# Patient Record
Sex: FEMALE | Race: WHITE | Employment: UNEMPLOYED | URBAN - METROPOLITAN AREA
[De-identification: names, ages, dates, MRNs, and addresses within clinical notes are randomized per-mention and may not be internally consistent; named-entity substitution may affect disease eponyms.]

---

## 2017-12-18 ENCOUNTER — APPOINTMENT (OUTPATIENT)
Dept: ULTRASOUND IMAGING | Age: 37
End: 2017-12-18
Attending: EMERGENCY MEDICINE
Payer: MEDICAID

## 2017-12-18 ENCOUNTER — HOSPITAL ENCOUNTER (EMERGENCY)
Age: 37
Discharge: HOME | End: 2017-12-18
Attending: EMERGENCY MEDICINE
Payer: MEDICAID

## 2017-12-18 VITALS
TEMPERATURE: 98.7 F | WEIGHT: 245 LBS | RESPIRATION RATE: 20 BRPM | HEART RATE: 100 BPM | OXYGEN SATURATION: 99 % | BODY MASS INDEX: 33.18 KG/M2 | HEIGHT: 72 IN | SYSTOLIC BLOOD PRESSURE: 129 MMHG | DIASTOLIC BLOOD PRESSURE: 78 MMHG

## 2017-12-18 DIAGNOSIS — R10.9 ABDOMINAL PAIN AFFECTING PREGNANCY: Primary | ICD-10-CM

## 2017-12-18 DIAGNOSIS — A59.01 TRICHOMONAL VAGINITIS DURING PREGNANCY IN SECOND TRIMESTER: ICD-10-CM

## 2017-12-18 DIAGNOSIS — O26.899 ABDOMINAL PAIN AFFECTING PREGNANCY: Primary | ICD-10-CM

## 2017-12-18 DIAGNOSIS — O23.592 TRICHOMONAL VAGINITIS DURING PREGNANCY IN SECOND TRIMESTER: ICD-10-CM

## 2017-12-18 LAB
ABO + RH BLD: NORMAL
ALBUMIN SERPL-MCNC: 3 G/DL (ref 3.5–5)
ALBUMIN/GLOB SERPL: 0.8 {RATIO} (ref 1.1–2.2)
ALP SERPL-CCNC: 82 U/L (ref 45–117)
ALT SERPL-CCNC: 19 U/L (ref 12–78)
ANION GAP SERPL CALC-SCNC: 11 MMOL/L (ref 5–15)
APPEARANCE UR: ABNORMAL
AST SERPL-CCNC: 21 U/L (ref 15–37)
BACTERIA URNS QL MICRO: NEGATIVE /HPF
BASOPHILS # BLD: 0 K/UL (ref 0–0.1)
BASOPHILS NFR BLD: 0 % (ref 0–1)
BILIRUB SERPL-MCNC: 0.2 MG/DL (ref 0.2–1)
BILIRUB UR QL: NEGATIVE
BLOOD BANK CMNT PATIENT-IMP: NORMAL
BUN SERPL-MCNC: 12 MG/DL (ref 6–20)
BUN/CREAT SERPL: 19 (ref 12–20)
C TRACH DNA SPEC QL NAA+PROBE: NEGATIVE
CALCIUM SERPL-MCNC: 9 MG/DL (ref 8.5–10.1)
CHLORIDE SERPL-SCNC: 103 MMOL/L (ref 97–108)
CLUE CELLS VAG QL WET PREP: NORMAL
CO2 SERPL-SCNC: 21 MMOL/L (ref 21–32)
COLOR UR: ABNORMAL
CREAT SERPL-MCNC: 0.62 MG/DL (ref 0.55–1.02)
DIFFERENTIAL METHOD BLD: ABNORMAL
EOSINOPHIL # BLD: 0.6 K/UL (ref 0–0.4)
EOSINOPHIL NFR BLD: 4 % (ref 0–7)
EPITH CASTS URNS QL MICRO: ABNORMAL /LPF
ERYTHROCYTE [DISTWIDTH] IN BLOOD BY AUTOMATED COUNT: 15.4 % (ref 11.5–14.5)
GLOBULIN SER CALC-MCNC: 3.9 G/DL (ref 2–4)
GLUCOSE SERPL-MCNC: 112 MG/DL (ref 65–100)
GLUCOSE UR STRIP.AUTO-MCNC: NEGATIVE MG/DL
HCG SERPL-ACNC: 6402 MIU/ML (ref 0–6)
HCG UR QL: POSITIVE
HCT VFR BLD AUTO: 36.8 % (ref 35–47)
HGB BLD-MCNC: 12 G/DL (ref 11.5–16)
HGB UR QL STRIP: NEGATIVE
KETONES UR QL STRIP.AUTO: NEGATIVE MG/DL
KOH PREP SPEC: NORMAL
LEUKOCYTE ESTERASE UR QL STRIP.AUTO: ABNORMAL
LYMPHOCYTES # BLD: 2 K/UL (ref 0.8–3.5)
LYMPHOCYTES NFR BLD: 15 % (ref 12–49)
MCH RBC QN AUTO: 27.6 PG (ref 26–34)
MCHC RBC AUTO-ENTMCNC: 32.6 G/DL (ref 30–36.5)
MCV RBC AUTO: 84.8 FL (ref 80–99)
MONOCYTES # BLD: 0.8 K/UL (ref 0–1)
MONOCYTES NFR BLD: 6 % (ref 5–13)
N GONORRHOEA DNA SPEC QL NAA+PROBE: NEGATIVE
NEUTS SEG # BLD: 9.9 K/UL (ref 1.8–8)
NEUTS SEG NFR BLD: 75 % (ref 32–75)
NITRITE UR QL STRIP.AUTO: NEGATIVE
PH UR STRIP: 5.5 [PH] (ref 5–8)
PLATELET # BLD AUTO: 129 K/UL (ref 150–400)
POTASSIUM SERPL-SCNC: 3.4 MMOL/L (ref 3.5–5.1)
PROT SERPL-MCNC: 6.9 G/DL (ref 6.4–8.2)
PROT UR STRIP-MCNC: NEGATIVE MG/DL
RBC # BLD AUTO: 4.34 M/UL (ref 3.8–5.2)
RBC #/AREA URNS HPF: ABNORMAL /HPF (ref 0–5)
SAMPLE TYPE: NORMAL
SERVICE CMNT-IMP: NORMAL
SERVICE CMNT-IMP: NORMAL
SODIUM SERPL-SCNC: 135 MMOL/L (ref 136–145)
SP GR UR REFRACTOMETRY: 1.02 (ref 1–1.03)
SPECIMEN SOURCE: NORMAL
T VAGINALIS VAG QL WET PREP: NORMAL
TRICHOMONAS UR QL MICRO: PRESENT
UR CULT HOLD, URHOLD: NORMAL
UROBILINOGEN UR QL STRIP.AUTO: 0.2 EU/DL (ref 0.2–1)
WBC # BLD AUTO: 13.2 K/UL (ref 3.6–11)
WBC URNS QL MICRO: ABNORMAL /HPF (ref 0–4)

## 2017-12-18 PROCEDURE — 76815 OB US LIMITED FETUS(S): CPT

## 2017-12-18 PROCEDURE — 85025 COMPLETE CBC W/AUTO DIFF WBC: CPT | Performed by: EMERGENCY MEDICINE

## 2017-12-18 PROCEDURE — 84702 CHORIONIC GONADOTROPIN TEST: CPT | Performed by: EMERGENCY MEDICINE

## 2017-12-18 PROCEDURE — 87491 CHLMYD TRACH DNA AMP PROBE: CPT | Performed by: EMERGENCY MEDICINE

## 2017-12-18 PROCEDURE — 86900 BLOOD TYPING SEROLOGIC ABO: CPT | Performed by: EMERGENCY MEDICINE

## 2017-12-18 PROCEDURE — 36415 COLL VENOUS BLD VENIPUNCTURE: CPT | Performed by: EMERGENCY MEDICINE

## 2017-12-18 PROCEDURE — 81001 URINALYSIS AUTO W/SCOPE: CPT | Performed by: EMERGENCY MEDICINE

## 2017-12-18 PROCEDURE — 96365 THER/PROPH/DIAG IV INF INIT: CPT

## 2017-12-18 PROCEDURE — 81025 URINE PREGNANCY TEST: CPT

## 2017-12-18 PROCEDURE — 74011250636 HC RX REV CODE- 250/636: Performed by: EMERGENCY MEDICINE

## 2017-12-18 PROCEDURE — 80053 COMPREHEN METABOLIC PANEL: CPT | Performed by: EMERGENCY MEDICINE

## 2017-12-18 PROCEDURE — 99285 EMERGENCY DEPT VISIT HI MDM: CPT

## 2017-12-18 PROCEDURE — 74011250637 HC RX REV CODE- 250/637: Performed by: EMERGENCY MEDICINE

## 2017-12-18 PROCEDURE — 87210 SMEAR WET MOUNT SALINE/INK: CPT | Performed by: EMERGENCY MEDICINE

## 2017-12-18 PROCEDURE — 74011000258 HC RX REV CODE- 258: Performed by: EMERGENCY MEDICINE

## 2017-12-18 RX ORDER — METRONIDAZOLE 250 MG/1
500 TABLET ORAL
Status: COMPLETED | OUTPATIENT
Start: 2017-12-18 | End: 2017-12-18

## 2017-12-18 RX ORDER — DIPHENHYDRAMINE HCL 25 MG
50 CAPSULE ORAL
COMMUNITY
End: 2017-12-27

## 2017-12-18 RX ORDER — ACETAMINOPHEN 500 MG
1000 TABLET ORAL
Status: COMPLETED | OUTPATIENT
Start: 2017-12-18 | End: 2017-12-18

## 2017-12-18 RX ORDER — AZITHROMYCIN 250 MG/1
1000 TABLET, FILM COATED ORAL
Status: COMPLETED | OUTPATIENT
Start: 2017-12-18 | End: 2017-12-18

## 2017-12-18 RX ORDER — METRONIDAZOLE 500 MG/1
500 TABLET ORAL 2 TIMES DAILY
Qty: 14 TAB | Refills: 0 | Status: SHIPPED | OUTPATIENT
Start: 2017-12-18 | End: 2017-12-25

## 2017-12-18 RX ADMIN — AZITHROMYCIN 1000 MG: 250 TABLET, FILM COATED ORAL at 03:41

## 2017-12-18 RX ADMIN — METRONIDAZOLE 500 MG: 250 TABLET ORAL at 03:55

## 2017-12-18 RX ADMIN — CEFTRIAXONE SODIUM 250 MG: 250 INJECTION, POWDER, FOR SOLUTION INTRAMUSCULAR; INTRAVENOUS at 03:30

## 2017-12-18 RX ADMIN — ACETAMINOPHEN 1000 MG: 500 TABLET ORAL at 02:37

## 2017-12-18 NOTE — ED NOTES
Discharge note: The patient was discharged home in stable condition, accompanied by boyfriend. The patient is alert and oriented, is in no respiratory distress and has vital signs within normal limits. The patient's diagnosis, condition and treatment were explained to patient by Dr Phillip Pacheco and reinforced by nurse. The patient party expressed understanding of discharge instructions, prescriptions, and plan of care. A discharge plan has been developed. A  was not involved in the process. Patient offered a wheelchair to ED lobby for discharge but declined at this time. Patient ambulatory to ED lobby to wait for cab to go home.

## 2017-12-18 NOTE — ED TRIAGE NOTES
Triage note:  Pt arrived via [phone] with c/o abd cramping and spotting. Pt stated she was told she is pregnant.

## 2017-12-18 NOTE — ED PROVIDER NOTES
HPI Comments: Nikki Puente is a 41 yo F with abdominal cramping and spotting. She has a history of bipolar disorder, schizophrenia, COPD and hypertension. She states that last month her mental health physician gave her a pregnancy test that was positive but then she said she took another one that was negative so she continued to take her oral contraceptive pills as wells as her psychiatric medication but she does not remember the name or dose. She spotted some at the end of November but did not have heavy bleeding. She started spotting again yesterday and this evening she started to have lower abdominal cramping. She is not sure when her last normal period was. She has had occasional nausea and vomiting. He has had 4 prior pregnancies that she is aware of but all ended in miscarriage. She denies lightheadedness, heavy bleeding, loss of fluid or passage of clots. She states that she does not have an OBGYN or primary care physician       Past Medical History:   Diagnosis Date    Chronic obstructive pulmonary disease ([facility] [address]     Hypertension     Psychiatric disorder        History reviewed. No pertinent surgical history. History reviewed. No pertinent family history. Social History     Social History    Marital status: N/A     Spouse name: N/A    Number of children: N/A    Years of education: N/A     Occupational History    Not on file. Social History Main Topics    Smoking status: Current Every Day Smoker     Packs/day: 1.00    Smokeless tobacco: Never Used    Alcohol use Not on file    Drug use: Not on file    Sexual activity: Not on file     Other Topics Concern    Not on file     Social History Narrative    No narrative on file         ALLERGIES: Bactrim [sulfamethoprim]; Pcn [penicillins]; and Bee pollen    Review of Systems   Constitutional: Negative for fever. HENT: Negative for sore throat. Eyes: Negative for visual disturbance. Respiratory: Negative for cough. Cardiovascular: Negative for chest pain. Gastrointestinal: Positive for abdominal pain (lower abdomen). Genitourinary: Positive for vaginal bleeding. Negative for dysuria. Musculoskeletal: Negative for back pain. Skin: Negative for rash. Neurological: Negative for headaches. Vitals:    12/18/17 0215 12/18/17 0240 12/18/17 0330 12/18/17 0345   BP: 133/79  127/67 129/78   Pulse:       Resp:       Temp:       SpO2: 98% 98% 99% 99%   Weight:       Height:                Physical Exam   Constitutional: She appears well-developed and well-nourished. No distress. HENT:   Head: Normocephalic and atraumatic. Mouth/Throat: Oropharynx is clear and moist.   Eyes: Conjunctivae and EOM are normal.   Neck: Normal range of motion and phonation normal.   Cardiovascular: Normal rate and intact distal pulses. Pulmonary/Chest: Effort normal. No respiratory distress. Abdominal: She exhibits mass (gravid, fundal height 5 cm below umbilicus). She exhibits no distension. Genitourinary: Cervix exhibits no motion tenderness and no friability. Vaginal discharge found. Musculoskeletal: Normal range of motion. She exhibits no tenderness. Neurological: She is alert. She is not disoriented. She exhibits normal muscle tone. Skin: Skin is warm and dry. Nursing note and vitals reviewed. [facility]  ED Course     3:39 AM  PAtient reassessed and states that pain is resolved. Wet prep significant for BV and trichamoniasis. Will treat with metronidazole 500mg BID for 7 days. Will also cover for GC and chlamydia with rocephin and azithromycin. US shows single IUP with gestational age 16weeks 2 days and . Advised patient to establish care with OB as soon as possible and follow-up with her psychiatrist to discuss discontinuation of meds.     Procedures

## 2017-12-18 NOTE — DISCHARGE INSTRUCTIONS
Trichomoniasis: Care Instructions  Your Care Instructions  Trichomoniasis is a sexually transmitted infection (STI) that is spread by having sex with an infected partner. Trichomoniasis is commonly called trich (say \"trick\"). In women, trich may cause vaginal itching and a smelly discharge. But in many cases, especially in men, there are no symptoms. Althnadir Forman is treated so that you do not spread it to others. Both you and your sex partner or partners should be treated at the same time so you do not infect each other again. Trich may cause problems with pregnancy. Your doctor will talk with you about treatment for Trich if you are pregnant. Follow-up care is a key part of your treatment and safety. Be sure to make and go to all appointments, and call your doctor if you are having problems. It's also a good idea to know your test results and keep a list of the medicines you take. How can you care for yourself at home? · Take your antibiotics as directed. Do not stop taking them just because you feel better. You need to take the full course of antibiotics. · Do not have sex while you are being treated. If your doctor gave you a single dose of antibiotics, do not have sex for one week after being treated and until your partner also has been treated. · Tell your sex partner (or partners) that he or she will also need to be tested and treated. · Use a cold water compress or cool baths to relieve itching. To prevent trichomoniasis in the future  · Use latex condoms every time you have sex. Use them from the beginning to the end of sexual contact. · Talk to your partner before having sex. Find out if he or she has or is at risk for trich or any other STI. Keep in mind that a person may be able to spread an STI even if he or she does not have symptoms. · Do not have sex if you are being treated for trich or any other STI.   · Do not have sex with anyone who has symptoms of an STI, such as sores on the genitals or mouth. · Having one sex partner (who does not have STIs and does not have sex with anyone else) is a good way to avoid STIs. When should you call for help? Call your doctor now or seek immediate medical care if:  ? · You have unusual vaginal bleeding. ? · You have a fever. ? · You have new discharge from the vagina or penis. ? · You have pelvic pain. ? Watch closely for changes in your health, and be sure to contact your doctor if:  ? · You do not get better as expected. ? · You have any new symptoms or your symptoms get worse. Where can you learn more? Go to http://max-lidia.info/. Enter X195 in the search box to learn more about \"Trichomoniasis: Care Instructions. \"  Current as of: March 20, 2017  Content Version: 11.4  © 5359-7408 [employer]. Care instructions adapted under license by [employer] (which disclaims liability or warranty for this information). If you have questions about a medical condition or this instruction, always ask your healthcare professional. [place] [address] any warranty or liability for your use of this information. Belly Pain in Pregnancy: Care Instructions  Your Care Instructions  When you're pregnant, any belly pain can be a worry. You may not want to call your doctor about every pain you have. But you don't want to miss something that is dangerous for you or your baby. Even if it feels familiar, belly pain can mean something new when you're pregnant. It's important to know when to call your doctor. It will also help to know how to care for yourself at home when your pain is not caused by anything harmful. · When belly pain is more severe or constant, see a doctor right away. · If you're sure your belly pain is a sign of labor, call your doctor. · When belly pain is brief, it's usually a normal part of pregnancy. It might be related to changes in the growing uterus.  Or it could be the stretching of ligaments called round ligaments. These ligaments help support the uterus. Round ligament pain can be on either side of your belly. It can also be felt in your hips or groin. Follow-up care is a key part of your treatment and safety. Be sure to make and go to all appointments, and call your doctor if you are having problems. It's also a good idea to know your test results and keep a list of the medicines you take. How can you tell if belly pain is a sign of labor? When belly pain is caused by labor, it can feel like mild or menstrual-like cramps in your lower belly. These cramps are probably contractions. They can happen in your second or third trimester. You may also have:  · A steady, dull ache in your lower back, pelvis, or thighs. · A feeling of pressure in your pelvis or lower belly. · Changes in your vaginal discharge or a sudden release of fluid from the vagina. If you think you are in labor, call your doctor. How can you care for yourself at home? When belly pain is mild and is not a symptom of labor:  · Rest until you feel better. · Take a warm bath. · Think about what you drink and eat:  ¨ Drink plenty of fluids. Choose water and other caffeine-free clear liquids until you feel better. ¨ Try eating small, frequent meals. If your stomach is upset, try bland, low-fat foods like plain rice, broiled chicken, toast, and yogurt. · Think about how you move if you are having brief pains from stretching of the round ligaments. ¨ Try gentle stretching. ¨ Move a little more slowly when turning in bed or getting up from a chair, so those ligaments don't stretch quickly. ¨ Lean forward a bit if you think you are going to cough or sneeze. When should you call for help? Call 911 anytime you think you may need emergency care. For example, call if:  · You have sudden, severe pain in your belly. · You have severe vaginal bleeding.   Call your doctor now or seek immediate medical care if:  · You have new or worse belly pain or cramping. · You have any vaginal bleeding. · You have a fever. · You have symptoms of preeclampsia, such as:  ¨ Sudden swelling of your face, hands, or feet. ¨ New vision problems (such as dimness or blurring). ¨ A severe headache. · You think that you may be in labor. This means that you've had at least 8 contractions within 1 hour or at least 4 contractions within 20 minutes, even after you change your position and drink fluids. · You have symptoms of a urinary tract infection. These may include:  ¨ Pain or burning when you urinate. ¨ A frequent need to urinate without being able to pass much urine. ¨ Pain in the flank, which is just below the rib cage and above the waist on either side of the back. ¨ Blood in your urine. Watch closely for changes in your health, and be sure to contact your doctor if you are worried about your or your baby's health. Where can you learn more? Go to Limitlesslane.be  Enter B275 in the search box to learn more about \"Belly Pain in Pregnancy: Care Instructions. \"   © 9651-7487 [employer]. Care instructions adapted under license by [employer] (which disclaims liability or warranty for this information). This care instruction is for use with your licensed healthcare professional. If you have questions about a medical condition or this instruction, always ask your healthcare professional. [place] [address] any warranty or liability for your use of this information.   Content Version: 69.1.566084; Current as of: November 12, 2015

## 2017-12-27 ENCOUNTER — HOSPITAL ENCOUNTER (EMERGENCY)
Age: 37
Discharge: HOME | End: 2017-12-27
Attending: EMERGENCY MEDICINE
Payer: MEDICAID

## 2017-12-27 ENCOUNTER — APPOINTMENT (OUTPATIENT)
Dept: ULTRASOUND IMAGING | Age: 37
End: 2017-12-27
Attending: EMERGENCY MEDICINE
Payer: MEDICAID

## 2017-12-27 VITALS
RESPIRATION RATE: 16 BRPM | SYSTOLIC BLOOD PRESSURE: 122 MMHG | HEIGHT: 72 IN | WEIGHT: 243 LBS | DIASTOLIC BLOOD PRESSURE: 61 MMHG | OXYGEN SATURATION: 98 % | TEMPERATURE: 97.9 F | BODY MASS INDEX: 32.91 KG/M2 | HEART RATE: 89 BPM

## 2017-12-27 DIAGNOSIS — O26.892 PELVIC PAIN AFFECTING PREGNANCY IN SECOND TRIMESTER, ANTEPARTUM: Primary | ICD-10-CM

## 2017-12-27 DIAGNOSIS — R10.2 PELVIC PAIN AFFECTING PREGNANCY IN SECOND TRIMESTER, ANTEPARTUM: Primary | ICD-10-CM

## 2017-12-27 DIAGNOSIS — K59.00 CONSTIPATION, UNSPECIFIED CONSTIPATION TYPE: ICD-10-CM

## 2017-12-27 LAB
ALBUMIN SERPL-MCNC: 3 G/DL (ref 3.5–5)
ALBUMIN/GLOB SERPL: 0.8 {RATIO} (ref 1.1–2.2)
ALP SERPL-CCNC: 81 U/L (ref 45–117)
ALT SERPL-CCNC: 24 U/L (ref 12–78)
ANION GAP SERPL CALC-SCNC: 11 MMOL/L (ref 5–15)
APPEARANCE UR: ABNORMAL
AST SERPL-CCNC: 25 U/L (ref 15–37)
BASOPHILS # BLD: 0 K/UL (ref 0–0.1)
BASOPHILS NFR BLD: 0 % (ref 0–1)
BILIRUB SERPL-MCNC: 0.2 MG/DL (ref 0.2–1)
BILIRUB UR QL: NEGATIVE
BUN SERPL-MCNC: 10 MG/DL (ref 6–20)
BUN/CREAT SERPL: 14 (ref 12–20)
CALCIUM SERPL-MCNC: 9.4 MG/DL (ref 8.5–10.1)
CHLORIDE SERPL-SCNC: 105 MMOL/L (ref 97–108)
CO2 SERPL-SCNC: 21 MMOL/L (ref 21–32)
COLOR UR: ABNORMAL
CREAT SERPL-MCNC: 0.69 MG/DL (ref 0.55–1.02)
DIFFERENTIAL METHOD BLD: ABNORMAL
EOSINOPHIL # BLD: 0.6 K/UL (ref 0–0.4)
EOSINOPHIL NFR BLD: 5 % (ref 0–7)
ERYTHROCYTE [DISTWIDTH] IN BLOOD BY AUTOMATED COUNT: 15.9 % (ref 11.5–14.5)
GLOBULIN SER CALC-MCNC: 4 G/DL (ref 2–4)
GLUCOSE SERPL-MCNC: 92 MG/DL (ref 65–100)
GLUCOSE UR STRIP.AUTO-MCNC: NEGATIVE MG/DL
HCG SERPL-ACNC: 5477 MIU/ML (ref 0–6)
HCT VFR BLD AUTO: 37.6 % (ref 35–47)
HGB BLD-MCNC: 12.2 G/DL (ref 11.5–16)
HGB UR QL STRIP: NEGATIVE
KETONES UR QL STRIP.AUTO: NEGATIVE MG/DL
LEUKOCYTE ESTERASE UR QL STRIP.AUTO: NEGATIVE
LYMPHOCYTES # BLD: 2.1 K/UL (ref 0.8–3.5)
LYMPHOCYTES NFR BLD: 17 % (ref 12–49)
MCH RBC QN AUTO: 27.5 PG (ref 26–34)
MCHC RBC AUTO-ENTMCNC: 32.4 G/DL (ref 30–36.5)
MCV RBC AUTO: 84.9 FL (ref 80–99)
MONOCYTES # BLD: 0.6 K/UL (ref 0–1)
MONOCYTES NFR BLD: 5 % (ref 5–13)
NEUTS SEG # BLD: 8.9 K/UL (ref 1.8–8)
NEUTS SEG NFR BLD: 73 % (ref 32–75)
NITRITE UR QL STRIP.AUTO: NEGATIVE
PH UR STRIP: 7 [PH] (ref 5–8)
PLATELET # BLD AUTO: 139 K/UL (ref 150–400)
POTASSIUM SERPL-SCNC: 3.6 MMOL/L (ref 3.5–5.1)
PROT SERPL-MCNC: 7 G/DL (ref 6.4–8.2)
PROT UR STRIP-MCNC: NEGATIVE MG/DL
RBC # BLD AUTO: 4.43 M/UL (ref 3.8–5.2)
SODIUM SERPL-SCNC: 137 MMOL/L (ref 136–145)
SP GR UR REFRACTOMETRY: 1.02 (ref 1–1.03)
UROBILINOGEN UR QL STRIP.AUTO: 0.2 EU/DL (ref 0.2–1)
WBC # BLD AUTO: 12.3 K/UL (ref 3.6–11)

## 2017-12-27 PROCEDURE — 74011250636 HC RX REV CODE- 250/636: Performed by: EMERGENCY MEDICINE

## 2017-12-27 PROCEDURE — 96360 HYDRATION IV INFUSION INIT: CPT

## 2017-12-27 PROCEDURE — 76815 OB US LIMITED FETUS(S): CPT

## 2017-12-27 PROCEDURE — 80053 COMPREHEN METABOLIC PANEL: CPT | Performed by: EMERGENCY MEDICINE

## 2017-12-27 PROCEDURE — 99284 EMERGENCY DEPT VISIT MOD MDM: CPT

## 2017-12-27 PROCEDURE — 87086 URINE CULTURE/COLONY COUNT: CPT | Performed by: EMERGENCY MEDICINE

## 2017-12-27 PROCEDURE — 81003 URINALYSIS AUTO W/O SCOPE: CPT | Performed by: EMERGENCY MEDICINE

## 2017-12-27 PROCEDURE — 84702 CHORIONIC GONADOTROPIN TEST: CPT | Performed by: EMERGENCY MEDICINE

## 2017-12-27 PROCEDURE — 36415 COLL VENOUS BLD VENIPUNCTURE: CPT | Performed by: EMERGENCY MEDICINE

## 2017-12-27 PROCEDURE — 74011000250 HC RX REV CODE- 250: Performed by: EMERGENCY MEDICINE

## 2017-12-27 PROCEDURE — 74011250637 HC RX REV CODE- 250/637: Performed by: EMERGENCY MEDICINE

## 2017-12-27 PROCEDURE — 94640 AIRWAY INHALATION TREATMENT: CPT

## 2017-12-27 PROCEDURE — 85025 COMPLETE CBC W/AUTO DIFF WBC: CPT | Performed by: EMERGENCY MEDICINE

## 2017-12-27 PROCEDURE — 77030013140 HC MSK NEB VYRM -A

## 2017-12-27 RX ORDER — SIMETHICONE 80 MG
80 TABLET,CHEWABLE ORAL
Status: COMPLETED | OUTPATIENT
Start: 2017-12-27 | End: 2017-12-27

## 2017-12-27 RX ORDER — POLYETHYLENE GLYCOL 3350 17 G/17G
17 POWDER, FOR SOLUTION ORAL DAILY
Qty: 238 G | Refills: 0 | Status: SHIPPED | OUTPATIENT
Start: 2017-12-27

## 2017-12-27 RX ORDER — ACETAMINOPHEN 500 MG
500 TABLET ORAL
COMMUNITY
End: 2021-03-24

## 2017-12-27 RX ADMIN — SIMETHICONE 80 MG: 80 TABLET, CHEWABLE ORAL at 19:28

## 2017-12-27 RX ADMIN — SODIUM CHLORIDE 1000 ML: 900 INJECTION, SOLUTION INTRAVENOUS at 19:24

## 2017-12-27 RX ADMIN — ALBUTEROL SULFATE 1 DOSE: 2.5 SOLUTION RESPIRATORY (INHALATION) at 19:28

## 2017-12-28 NOTE — DISCHARGE INSTRUCTIONS
Constipation: Care Instructions  Your Care Instructions    Constipation means that you have a hard time passing stools (bowel movements). People pass stools from 3 times a day to once every 3 days. What is normal for you may be different. Constipation may occur with pain in the rectum and cramping. The pain may get worse when you try to pass stools. Sometimes there are small amounts of bright red blood on toilet paper or the surface of stools. This is because of enlarged veins near the rectum (hemorrhoids). A few changes in your diet and lifestyle may help you avoid ongoing constipation. Your doctor may also prescribe medicine to help loosen your stool. Some medicines can cause constipation. These include pain medicines and antidepressants. Tell your doctor about all the medicines you take. Your doctor may want to make a medicine change to ease your symptoms. Follow-up care is a key part of your treatment and safety. Be sure to make and go to all appointments, and call your doctor if you are having problems. It's also a good idea to know your test results and keep a list of the medicines you take. How can you care for yourself at home? · Drink plenty of fluids, enough so that your urine is light yellow or clear like water. If you have kidney, heart, or liver disease and have to limit fluids, talk with your doctor before you increase the amount of fluids you drink. · Include high-fiber foods in your diet each day. These include fruits, vegetables, beans, and whole grains. · Get at least 30 minutes of exercise on most days of the week. Walking is a good choice. You also may want to do other activities, such as running, swimming, cycling, or playing tennis or team sports. · Take a fiber supplement, such as Citrucel or Metamucil, every day. Read and follow all instructions on the label. · Schedule time each day for a bowel movement. A daily routine may help.  Take your time having your bowel movement. · Support your feet with a small step stool when you sit on the toilet. This helps flex your hips and places your pelvis in a squatting position. · Your doctor may recommend an over-the-counter laxative to relieve your constipation. Examples are Milk of Magnesia and MiraLax. Read and follow all instructions on the label. Do not use laxatives on a long-term basis. When should you call for help? Call your doctor now or seek immediate medical care if:  ? · You have new or worse belly pain. ? · You have new or worse nausea or vomiting. ? · You have blood in your stools. ? Watch closely for changes in your health, and be sure to contact your doctor if:  ? · Your constipation is getting worse. ? · You do not get better as expected. Where can you learn more? Go to http://max-lidia.info/. Enter [phone] in the search box to learn more about \"Constipation: Care Instructions. \"  Current as of: March 20, 2017  Content Version: 11.4  © 1480-6082 [employer]. Care instructions adapted under license by [employer] (which disclaims liability or warranty for this information). If you have questions about a medical condition or this instruction, always ask your healthcare professional. [address] any warranty or liability for your use of this information. Belly Pain in Pregnancy: Care Instructions  Your Care Instructions    When you're pregnant, any belly pain can be a worry. You may not want to call your doctor about every pain you have. But you don't want to miss something that is dangerous for you or your baby. Even if it feels familiar, belly pain can mean something new when you're pregnant. It's important to know when to call your doctor. It will also help to know how to care for yourself at home when your pain is not caused by anything harmful. · When belly pain is more severe or constant, see a doctor right away.   · If you're sure your belly pain is a sign of labor, call your doctor. · When belly pain is brief, it's usually a normal part of pregnancy. It might be related to changes in the growing uterus. Or it could be the stretching of ligaments called round ligaments. These ligaments help support the uterus. Round ligament pain can be on either side of your belly. It can also be felt in your hips or groin. Follow-up care is a key part of your treatment and safety. Be sure to make and go to all appointments, and call your doctor if you are having problems. It's also a good idea to know your test results and keep a list of the medicines you take. How can you tell if belly pain is a sign of labor? When belly pain is caused by labor, it can feel like mild or menstrual-like cramps in your lower belly. These cramps are probably contractions. They can happen in your second or third trimester. You may also have:  · A steady, dull ache in your lower back, pelvis, or thighs. · A feeling of pressure in your pelvis or lower belly. · Changes in your vaginal discharge or a sudden release of fluid from the vagina. If you think you are in labor, call your doctor. How can you care for yourself at home? When belly pain is mild and is not a symptom of labor:  · Rest until you feel better. · Take a warm bath. · Think about what you drink and eat:  ¨ Drink plenty of fluids. Choose water and other caffeine-free clear liquids until you feel better. ¨ Try eating small, frequent meals. If your stomach is upset, try bland, low-fat foods like plain rice, broiled chicken, toast, and yogurt. · Think about how you move if you are having brief pains from stretching of the round ligaments. ¨ Try gentle stretching. ¨ Move a little more slowly when turning in bed or getting up from a chair, so those ligaments don't stretch quickly. ¨ Lean forward a bit if you think you are going to cough or sneeze. When should you call for help?   Call 911 anytime you think you may need emergency care. For example, call if:  ? · You have sudden, severe pain in your belly. ? · You have severe vaginal bleeding. ?Call your doctor now or seek immediate medical care if:  ? · You have new or worse belly pain or cramping. ? · You have any vaginal bleeding. ? · You have a fever. ? · You have symptoms of preeclampsia, such as:  ¨ Sudden swelling of your face, hands, or feet. ¨ New vision problems (such as dimness or blurring). ¨ A severe headache. ? · You think that you may be in labor. This means that you've had at least 8 contractions within 1 hour or at least 4 contractions within 20 minutes, even after you change your position and drink fluids. ? · You have symptoms of a urinary tract infection. These may include:  ¨ Pain or burning when you urinate. ¨ A frequent need to urinate without being able to pass much urine. ¨ Pain in the flank, which is just below the rib cage and above the waist on either side of the back. ¨ Blood in your urine. ? Watch closely for changes in your health, and be sure to contact your doctor if you are worried about your or your baby's health. Where can you learn more? Go to http://max-lidia.info/. Enter [phone] in the search box to learn more about \"Belly Pain in Pregnancy: Care Instructions. \"  Current as of: March 16, 2017  Content Version: 11.4  © 2693-4721 [employer]. Care instructions adapted under license by [employer] (which disclaims liability or warranty for this information). If you have questions about a medical condition or this instruction, always ask your healthcare professional. [place] [address] any warranty or liability for your use of this information. Pelvic Pain: Care Instructions  Your Care Instructions    Pelvic pain, or pain in the lower belly, can have many causes. Often pelvic pain is not serious and gets better in a few days.  If your pain continues or gets worse, you may need tests and treatment. Tell your doctor about any new symptoms. These may be signs of a serious problem. Follow-up care is a key part of your treatment and safety. Be sure to make and go to all appointments, and call your doctor if you are having problems. It's also a good idea to know your test results and keep a list of the medicines you take. How can you care for yourself at home? · Rest until you feel better. Lie down, and raise your legs by placing a pillow under your knees. · Drink plenty of fluids. You may find that small, frequent sips are easier on your stomach than if you drink a lot at once. Avoid drinks with carbonation or caffeine, such as soda pop, tea, or coffee. · Try eating several small meals instead of 2 or 3 large ones. Eat mild foods, such as rice, dry toast or crackers, bananas, and applesauce. Avoid fatty and spicy foods, other fruits, and alcohol until 48 hours after your symptoms have gone away. · Take an over-the-counter pain medicine, such as acetaminophen (Tylenol), ibuprofen (Advil, Motrin), or naproxen (Aleve). Read and follow all instructions on the label. · Do not take two or more pain medicines at the same time unless the doctor told you to. Many pain medicines have acetaminophen, which is Tylenol. Too much acetaminophen (Tylenol) can be harmful. · You can put a heating pad, a warm cloth, or moist heat on your belly to relieve pain. When should you call for help? Call your doctor now or seek immediate medical care if:  · You have a new or higher fever. · You have unusual vaginal bleeding. · You have new or worse belly or pelvic pain. · You have vaginal discharge that has increased in amount or smells bad. Watch closely for changes in your health, and be sure to contact your doctor if:  · You do not get better as expected. Where can you learn more? Go to http://max-lidia.info/.   Enter [phone] in the search box to learn more about \"Pelvic Pain: Care Instructions. \"  Current as of: October 13, 2016  Content Version: 11.4  © 5427-1501 [employer]. Care instructions adapted under license by [employer] (which disclaims liability or warranty for this information). If you have questions about a medical condition or this instruction, always ask your healthcare professional. [address] any warranty or liability for your use of this information. Weeks 14 to 18 of Your Pregnancy: Care Instructions  Your Care Instructions    During this time, you may start to \"show,\" so that you look pregnant to people around you. You may also notice some changes in your skin, such as itchy spots on your palms or acne on your face. Your baby is now able to pass urine, and your baby's first stool (meconium) is starting to collect in his or her intestines. Hair is also beginning to grow on your baby's head. At your next visit, between weeks 18 and 20, your doctor may do an ultrasound test. The test allows your doctor to check for certain problems. Your doctor can also tell the sex of your baby. This is a good time to think about whether you want to know whether your baby is a boy or a girl. Talk to your doctor about getting a flu shot to help keep you healthy during your pregnancy. As your pregnancy moves along, it is common to worry or feel anxious. Your body is changing a lot. And you are thinking about giving birth, the health of your baby, and becoming a parent. You can learn to cope with any anxiety and stress you feel. Follow-up care is a key part of your treatment and safety. Be sure to make and go to all appointments, and call your doctor if you are having problems. It's also a good idea to know your test results and keep a list of the medicines you take. How can you care for yourself at home? ?Reduce stress  ? · Ask for help with cooking and housekeeping.    ? · Figure out who or what causes your stress. Avoid these people or situations as much as possible. ? · Relax every day. Taking 10- to 15-minute breaks can make a big difference. Take a walk, listen to music, or take a warm bath. ? · Learn relaxation techniques at prenatal or yoga class. Or buy a relaxation tape. ? · List your fears about having a baby and becoming a parent. Share the list with someone you trust. Decide which worries are really small, and try to let them go. Exercise  ? · If you did not exercise much before pregnancy, start slowly. Walking is best. Lonzie Flory yourself, and do a little more every day. ? · Brisk walking, easy jogging, low-impact aerobics, water aerobics, and yoga are good choices. Some sports, such as scuba diving, horseback riding, downhill skiing, gymnastics, and water skiing, are not a good idea. ? · Try to do at least 2½ hours a week of moderate exercise, such as a fast walk. One way to do this is to be active 30 minutes a day, at least 5 days a week. It's fine to be active in blocks of 10 minutes or more throughout your day and week. ? · Wear loose clothing. And wear shoes and a bra that provide good support. ? · Warm up and cool down to start and finish your exercise. ? · If you want to use weights, be sure to use light weights. They reduce stress on your joints. ?Stay at the best weight for you  ? · Experts recommend that you gain about 1 pound a month during the first 3 months of your pregnancy. ? · Experts recommend that you gain about 1 pound a week during your last 6 months of pregnancy, for a total weight gain of 25 to 35 pounds. ? · If you are underweight, you will need to gain more weight (about 28 to 40 pounds). ? · If you are overweight, you may not need to gain as much weight (about 15 to 25 pounds). ? · If you are gaining weight too fast, use common sense. Exercise every day, and limit sweets, fast foods, and fats. Choose lean meats, fruits, and vegetables.    ? · If you are having twins or more, your doctor may refer you to a dietitian. Where can you learn more? Go to http://max-lidia.info/. Enter B006 in the search box to learn more about \"Weeks 14 to 18 of Your Pregnancy: Care Instructions. \"  Current as of: March 16, 2017  Content Version: 11.4  © 3765-5581 [employer]. Care instructions adapted under license by [employer] (which disclaims liability or warranty for this information). If you have questions about a medical condition or this instruction, always ask your healthcare professional. [place] [address] any warranty or liability for your use of this information.

## 2017-12-28 NOTE — ED NOTES
IV fluids infusing via gravity without difficulty as ordered. Patient medicated for abdominal discomfort as ordered. Tolerated PO simethicone well. Patient receiving ordered nebulizer treatment. Tolerating well. Updated regarding plan of care and associated time constraints. Patient verbalizes understanding and agreement to current care plan. Call bell in reach. Will continue to monitor.

## 2017-12-28 NOTE — ED PROVIDER NOTES
HPI Comments: 81-year-old female  at 18 weeks presents with complaints of one hour sharp stabbing lower pelvic pain. Positive fetal movement. Denies bleeding. Denies trauma. Denies urinary complaints. Denies recent illness, fever, chills, cough, shortness of breath, chest pain, back pain, vaginal bleeding. No prenatal care to date. Seen in the emergency department approximately 2 weeks ago for same pain. At that time was positive for trichomonas and bacterial vaginosis, completed Flagyl x7 days. Was also treated for GC/Chlamydia, both negative on PCR swab. Denies vaginal discharge, odor, itchiness. Has first appointment with OB, Dr. Patsy Cunningham, on 2018. No current prenatal vitamins. Reports intermittent constipation with pregnancy. Also reports nausea and vomiting which she attributes to pregnancy. No current nausea or vomiting. Tolerating food well. Positive tobacco use  Denies drug and alcohol use  No primary care physician     The history is provided by the patient and medical records. Past Medical History:   Diagnosis Date    Bipolar 1 disorder ([facility] [address]     Chronic obstructive pulmonary disease ([facility]     Hypertension     Psychiatric disorder        History reviewed. No pertinent surgical history. History reviewed. No pertinent family history. Social History     Social History    Marital status: SINGLE     Spouse name: N/A    Number of children: N/A    Years of education: N/A     Occupational History    Not on file. Social History Main Topics    Smoking status: Current Every Day Smoker     Packs/day: 0.25    Smokeless tobacco: Never Used    Alcohol use No    Drug use: No    Sexual activity: Yes     Partners: Male     Other Topics Concern    Not on file     Social History Narrative         ALLERGIES: Bactrim [sulfamethoprim]; Pcn [penicillins]; and Bee pollen    Review of Systems   Constitutional: Negative for chills and fever.    HENT: Negative for congestion, nosebleeds and rhinorrhea. Eyes: Negative for pain and redness. Respiratory: Negative for cough and shortness of breath. Cardiovascular: Negative for chest pain and palpitations. Gastrointestinal: Positive for abdominal pain. Negative for nausea and vomiting. Genitourinary: Negative for dysuria, frequency, vaginal bleeding and vaginal pain. Musculoskeletal: Negative for myalgias. Skin: Negative for rash and wound. Neurological: Negative for seizures, syncope and weakness. Hematological: Does not bruise/bleed easily. Psychiatric/Behavioral: Negative for agitation, confusion, dysphoric mood and suicidal ideas. The patient is not nervous/anxious. Vitals:    12/27/17 1907   BP: 155/74   Pulse: 76   Resp: 16   Temp: 97.9 °F (36.6 °C)   SpO2: 96%   Weight: 110.2 kg (243 lb)   Height: 6' (1.829 m)            Physical Exam   Constitutional: She is oriented to person, place, and time. She appears well-developed and well-nourished. HENT:   Head: Normocephalic and atraumatic. Eyes: EOM are normal. Pupils are equal, round, and reactive to light. Neck: Normal range of motion. Neck supple. No tracheal deviation present. Cardiovascular: Normal rate, regular rhythm, normal heart sounds and intact distal pulses. Pulmonary/Chest: Effort normal and breath sounds normal. No stridor. No respiratory distress. She has no wheezes. She has no rales. She exhibits no tenderness. Abdominal: Soft. Bowel sounds are normal. She exhibits no distension. There is no tenderness. There is no rebound. Musculoskeletal: Normal range of motion. She exhibits no edema or tenderness. Neurological: She is alert and oriented to person, place, and time. No cranial nerve deficit. Skin: Skin is warm and dry. No rash noted. No pallor. Psychiatric: She has a normal mood and affect. Nursing note and vitals reviewed.        MDM  Number of Diagnoses or Management Options  Constipation, unspecified constipation type: Pelvic pain affecting pregnancy in second trimester, antepartum:   Diagnosis management comments: 80-year-old female  020 at 18 weeks presents with stabbing pelvic pain x1 hour. Patient is well-appearing, in no acute distress, hemodynamically stable, sitting in bed comfortably speaking comfortably, bilateral expiratory wheezing without respiratory distress, normal room-air oxygen saturation. Abdomen soft/nontender/nondistended. Normal bowel sounds. Record review, and emergency department approximately 2 weeks ago with 16 week IUP on ultrasound, positive bacterial vaginosis and trichomonas. Plan-IV fluid hydration, nebulizer therapy, Mylicon, CBC/CMP/UA/urine culture, abdominal ultrasound. Advised tobacco cessation. Labs unremarkable  US shows 17wk 4d iup with fht 150       Amount and/or Complexity of Data Reviewed  Clinical lab tests: ordered and reviewed  Tests in the radiology section of CPT®: ordered and reviewed  Review and summarize past medical records: yes  Independent visualization of images, tracings, or specimens: yes      ED Course       Procedures      8:29 PM  Ambulatory in ER in no distress. Well appearing, nad, hd stable. No resp distress. 9:12 PM  Discussed results with patient and encouraged smoking cessation, prenatal vitamin use and OB f/u. Pt and partner expressed understanding. 9:13 PM  Patient's results have been reviewed with them. Patient and/or family have verbally conveyed their understanding and agreement of the patient's signs, symptoms, diagnosis, treatment and prognosis and additionally agree to follow up as recommended or return to the Emergency Room should their condition change prior to follow-up.   Discharge instructions have also been provided to the patient with some educational information regarding their diagnosis as well a list of reasons why they would want to return to the ER prior to their follow-up appointment should their condition change.

## 2017-12-28 NOTE — ED NOTES
IV access established and blood samples obtained for ordered testing. Patient tolerated procedure well. Patient ambulatory to restroom to obtain ordered urine sample. Visitor at bedside. Patient in no distress.

## 2017-12-28 NOTE — ED TRIAGE NOTES
Patient presents to treatment area via EMS. Patient complains of lower abdominal/pelvic pain that began about one hour PTA. Patient was recently seen here and was found to be pregnant. Patient has appt with OB/GYN coming up. Patient states she has taken tylenol for the pain. Last bowel movement was today and normal per patient. Denies urinary symptoms or fever.

## 2017-12-28 NOTE — ED NOTES
The patient was discharged home by Dr. Mathieu Weaver in stable condition. The patient is alert and oriented, in no respiratory distress and discharge vital signs obtained. The patient's diagnosis, condition and treatment were explained. The patient expressed understanding. Two prescriptions given. No work/school note given. A discharge plan has been developed. A  was not involved in the process. Aftercare instructions were given. Pt ambulatory out of the ED.

## 2017-12-29 LAB
BACTERIA SPEC CULT: NORMAL
CC UR VC: NORMAL
SERVICE CMNT-IMP: NORMAL

## 2021-03-18 ENCOUNTER — HOSPITAL ENCOUNTER (INPATIENT)
Age: 41
LOS: 6 days | Discharge: HOME | DRG: 885 | End: 2021-03-24
Attending: PSYCHIATRY & NEUROLOGY | Admitting: PSYCHIATRY & NEUROLOGY
Payer: MEDICARE

## 2021-03-18 DIAGNOSIS — F31.64 BIPOLAR DISORDER, CURRENT EPISODE MIXED, SEVERE, WITH PSYCHOTIC FEATURES (HCC): ICD-10-CM

## 2021-03-18 DIAGNOSIS — R45.851 SUICIDAL IDEATION: ICD-10-CM

## 2021-03-18 DIAGNOSIS — R44.0 AUDITORY HALLUCINATION: Primary | ICD-10-CM

## 2021-03-18 LAB
ALBUMIN SERPL-MCNC: 3.6 G/DL (ref 3.5–5)
ALBUMIN/GLOB SERPL: 0.9 {RATIO} (ref 1.1–2.2)
ALP SERPL-CCNC: 126 U/L (ref 45–117)
ALT SERPL-CCNC: 26 U/L (ref 12–78)
AMPHET UR QL SCN: NEGATIVE
ANION GAP SERPL CALC-SCNC: 4 MMOL/L (ref 5–15)
APPEARANCE UR: ABNORMAL
AST SERPL W P-5'-P-CCNC: 20 U/L (ref 15–37)
BACTERIA URNS QL MICRO: NEGATIVE /HPF
BARBITURATES UR QL SCN: NEGATIVE
BASOPHILS # BLD: 0 K/UL (ref 0–0.1)
BASOPHILS NFR BLD: 1 % (ref 0–1)
BENZODIAZ UR QL: NEGATIVE
BILIRUB SERPL-MCNC: 0.2 MG/DL (ref 0.2–1)
BILIRUB UR QL: NEGATIVE
BUN SERPL-MCNC: 11 MG/DL (ref 6–20)
BUN/CREAT SERPL: 15 (ref 12–20)
CA-I BLD-MCNC: 9.1 MG/DL (ref 8.5–10.1)
CANNABINOIDS UR QL SCN: POSITIVE
CHLORIDE SERPL-SCNC: 108 MMOL/L (ref 97–108)
CO2 SERPL-SCNC: 26 MMOL/L (ref 21–32)
COCAINE UR QL SCN: NEGATIVE
COLOR UR: ABNORMAL
CREAT SERPL-MCNC: 0.74 MG/DL (ref 0.55–1.02)
DIFFERENTIAL METHOD BLD: ABNORMAL
DRUG SCRN COMMENT,DRGCM: ABNORMAL
EOSINOPHIL # BLD: 0.6 K/UL (ref 0–0.4)
EOSINOPHIL NFR BLD: 7 % (ref 0–7)
ERYTHROCYTE [DISTWIDTH] IN BLOOD BY AUTOMATED COUNT: 18.3 % (ref 11.5–14.5)
GLOBULIN SER CALC-MCNC: 4.1 G/DL (ref 2–4)
GLUCOSE SERPL-MCNC: 86 MG/DL (ref 65–100)
GLUCOSE UR STRIP.AUTO-MCNC: NEGATIVE MG/DL
HCT VFR BLD AUTO: 34.5 % (ref 35–47)
HGB BLD-MCNC: 10 G/DL (ref 11.5–16)
HGB UR QL STRIP: ABNORMAL
IMM GRANULOCYTES # BLD AUTO: 0 K/UL (ref 0–0.04)
IMM GRANULOCYTES NFR BLD AUTO: 0 % (ref 0–0.5)
KETONES UR QL STRIP.AUTO: NEGATIVE MG/DL
LEUKOCYTE ESTERASE UR QL STRIP.AUTO: NEGATIVE
LYMPHOCYTES # BLD: 1.8 K/UL (ref 0.8–3.5)
LYMPHOCYTES NFR BLD: 22 % (ref 12–49)
MCH RBC QN AUTO: 20.1 PG (ref 26–34)
MCHC RBC AUTO-ENTMCNC: 29 G/DL (ref 30–36.5)
MCV RBC AUTO: 69.3 FL (ref 80–99)
METHADONE UR QL: NEGATIVE
MONOCYTES # BLD: 0.5 K/UL (ref 0–1)
MONOCYTES NFR BLD: 6 % (ref 5–13)
MUCOUS THREADS URNS QL MICRO: ABNORMAL /LPF
NEUTS SEG # BLD: 5.1 K/UL (ref 1.8–8)
NEUTS SEG NFR BLD: 64 % (ref 32–75)
NITRITE UR QL STRIP.AUTO: NEGATIVE
NRBC # BLD: 0 K/UL (ref 0–0.01)
NRBC BLD-RTO: 0 PER 100 WBC
OPIATES UR QL: NEGATIVE
PCP UR QL: NEGATIVE
PH UR STRIP: 6 [PH] (ref 5–8)
PLATELET # BLD AUTO: 219 K/UL (ref 150–400)
POTASSIUM SERPL-SCNC: 3.6 MMOL/L (ref 3.5–5.1)
PROT SERPL-MCNC: 7.7 G/DL (ref 6.4–8.2)
PROT UR STRIP-MCNC: NEGATIVE MG/DL
RBC # BLD AUTO: 4.98 M/UL (ref 3.8–5.2)
RBC #/AREA URNS HPF: ABNORMAL /HPF (ref 0–5)
SARS-COV-2, COV2: NORMAL
SARS-COV-2, COV2: NOT DETECTED
SODIUM SERPL-SCNC: 138 MMOL/L (ref 136–145)
SP GR UR REFRACTOMETRY: 1.02 (ref 1–1.03)
UA: UC IF INDICATED,UAUC: ABNORMAL
UROBILINOGEN UR QL STRIP.AUTO: 2 EU/DL (ref 0.1–1)
WBC # BLD AUTO: 8 K/UL (ref 3.6–11)
WBC URNS QL MICRO: ABNORMAL /HPF (ref 0–4)

## 2021-03-18 PROCEDURE — 81001 URINALYSIS AUTO W/SCOPE: CPT

## 2021-03-18 PROCEDURE — 65220000003 HC RM SEMIPRIVATE PSYCH

## 2021-03-18 PROCEDURE — 36415 COLL VENOUS BLD VENIPUNCTURE: CPT

## 2021-03-18 PROCEDURE — 85025 COMPLETE CBC W/AUTO DIFF WBC: CPT

## 2021-03-18 PROCEDURE — 80307 DRUG TEST PRSMV CHEM ANLYZR: CPT

## 2021-03-18 PROCEDURE — 80053 COMPREHEN METABOLIC PANEL: CPT

## 2021-03-18 PROCEDURE — 74011250637 HC RX REV CODE- 250/637: Performed by: PHYSICIAN ASSISTANT

## 2021-03-18 PROCEDURE — 87635 SARS-COV-2 COVID-19 AMP PRB: CPT

## 2021-03-18 PROCEDURE — 99283 EMERGENCY DEPT VISIT LOW MDM: CPT

## 2021-03-18 RX ORDER — ACETAMINOPHEN 325 MG/1
650 TABLET ORAL
Status: DISCONTINUED | OUTPATIENT
Start: 2021-03-18 | End: 2021-03-24 | Stop reason: HOSPADM

## 2021-03-18 RX ORDER — TRAZODONE HYDROCHLORIDE 50 MG/1
50 TABLET ORAL
Status: DISCONTINUED | OUTPATIENT
Start: 2021-03-18 | End: 2021-03-24 | Stop reason: HOSPADM

## 2021-03-18 RX ORDER — HYDROXYZINE 25 MG/1
25 TABLET, FILM COATED ORAL
Status: COMPLETED | OUTPATIENT
Start: 2021-03-18 | End: 2021-03-18

## 2021-03-18 RX ORDER — MAG HYDROX/ALUMINUM HYD/SIMETH 200-200-20
30 SUSPENSION, ORAL (FINAL DOSE FORM) ORAL
Status: DISCONTINUED | OUTPATIENT
Start: 2021-03-18 | End: 2021-03-24 | Stop reason: HOSPADM

## 2021-03-18 RX ORDER — RISPERIDONE 1 MG/1
1 TABLET, FILM COATED ORAL 2 TIMES DAILY
Status: DISCONTINUED | OUTPATIENT
Start: 2021-03-19 | End: 2021-03-22

## 2021-03-18 RX ORDER — ADHESIVE BANDAGE
30 BANDAGE TOPICAL DAILY PRN
Status: DISCONTINUED | OUTPATIENT
Start: 2021-03-18 | End: 2021-03-24 | Stop reason: HOSPADM

## 2021-03-18 RX ORDER — GABAPENTIN 300 MG/1
300 CAPSULE ORAL 3 TIMES DAILY
Status: DISCONTINUED | OUTPATIENT
Start: 2021-03-19 | End: 2021-03-24 | Stop reason: HOSPADM

## 2021-03-18 RX ORDER — HYDROXYZINE PAMOATE 50 MG/1
50 CAPSULE ORAL
Status: DISCONTINUED | OUTPATIENT
Start: 2021-03-18 | End: 2021-03-24 | Stop reason: HOSPADM

## 2021-03-18 RX ADMIN — HYDROXYZINE HYDROCHLORIDE 25 MG: 25 TABLET, FILM COATED ORAL at 16:22

## 2021-03-18 NOTE — ED PROVIDER NOTES
EMERGENCY DEPARTMENT HISTORY AND PHYSICAL EXAM      Date: 3/18/2021  Patient Name: Robb Acosta    History of Presenting Illness     Chief Complaint   Patient presents with    Suicidal       History Provided By: Patient    HPI: Robb Acosta, 45 y.o. female with a past medical history significant for schizophrenia, bipolar disorder, and MDD who presents to the ED with cc of gradually worsening auditory hallucinations telling patient to kill herself x3 to 4 days. Patient endorses suicidal ideations but no attempts. She states she has a plan to overdose on her gabapentin but has not attempted to do so yet. Patient reports she was supposed be on Latuda but stopped it because it made her itchy. Patient currently without any other complaints. Denies any visual hallucinations, HI, fever, chills, chest pain, shortness of breath, nausea, vomiting, diarrhea    There are no other complaints, changes, or physical findings at this time. PCP: None    No current facility-administered medications on file prior to encounter. Current Outpatient Medications on File Prior to Encounter   Medication Sig Dispense Refill    acetaminophen (TYLENOL EXTRA STRENGTH) 500 mg tablet Take 500 mg by mouth every six (6) hours as needed for Pain.  polyethylene glycol (MIRALAX) 17 gram/dose powder Take 17 g by mouth daily. 1 tablespoon with 8 oz of water daily 238 g 0    prenatal vit-iron fumarate-fa (PRENATAL VITAMIN) 27 mg iron- 0.8 mg tab tablet Take 1 Tab by mouth daily. 30 Tab 0       Past History     Past Medical History:  Past Medical History:   Diagnosis Date    [facility]     Chronic obstructive pulmonary disease [facility]     Hypertension     Psychiatric disorder        Past Surgical History:  No past surgical history on file. Family History:  No family history on file.     Social History:  Social History     Tobacco Use    Smoking status: Current Every Day Smoker     Packs/day: 0.25    Smokeless tobacco: Never Used   Substance Use Topics    Alcohol use: No    Drug use: No       Allergies: Allergies   Allergen Reactions    Bactrim [Sulfamethoprim] Swelling    Pcn [Penicillins] Swelling    Bee Pollen Swelling         Review of Systems     Review of Systems   Constitutional: Negative for chills, fatigue and fever. HENT: Negative. Respiratory: Negative for cough, chest tightness, shortness of breath and wheezing. Cardiovascular: Negative for chest pain and palpitations. Gastrointestinal: Negative for abdominal pain, diarrhea, nausea and vomiting. Genitourinary: Negative for frequency and urgency. Musculoskeletal: Negative for back pain, neck pain and neck stiffness. Skin: Negative for rash. Neurological: Negative for dizziness, weakness, light-headedness and headaches. Psychiatric/Behavioral: Positive for hallucinations and suicidal ideas. All other systems reviewed and are negative. Physical Exam     Physical Exam  Vitals signs and nursing note reviewed. Constitutional:       General: She is not in acute distress. Appearance: Normal appearance. She is well-developed. She is not ill-appearing, toxic-appearing or diaphoretic. HENT:      Head: Normocephalic and atraumatic. Nose: Nose normal. No congestion or rhinorrhea. Mouth/Throat:      Mouth: Mucous membranes are moist.      Pharynx: Oropharynx is clear. No oropharyngeal exudate or posterior oropharyngeal erythema. Eyes:      General: No scleral icterus. Conjunctiva/sclera: Conjunctivae normal.      Pupils: Pupils are equal, round, and reactive to light. Neck:      Musculoskeletal: Normal range of motion and neck supple. No neck rigidity or muscular tenderness. Cardiovascular:      Rate and Rhythm: Normal rate and regular rhythm. Pulses:           Radial pulses are 2+ on the right side and 2+ on the left side. Dorsalis pedis pulses are 2+ on the right side and 2+ on the left side.       Heart sounds: No murmur. No friction rub. No gallop. Pulmonary:      Effort: Pulmonary effort is normal. No tachypnea, accessory muscle usage, respiratory distress or retractions. Breath sounds: Normal breath sounds. No stridor. No decreased breath sounds, wheezing, rhonchi or rales. Chest:      Chest wall: No tenderness. Abdominal:      General: Bowel sounds are normal. There is no distension. Palpations: Abdomen is soft. There is no mass. Tenderness: There is no abdominal tenderness. There is no right CVA tenderness, left CVA tenderness, guarding or rebound. Musculoskeletal: Normal range of motion. General: No deformity. Right lower leg: No edema. Left lower leg: No edema. Skin:     General: Skin is warm and dry. Capillary Refill: Capillary refill takes less than 2 seconds. Coloration: Skin is not jaundiced or pale. Findings: No bruising, erythema or rash. Neurological:      General: No focal deficit present. Mental Status: She is alert and oriented to person, place, and time. Mental status is at baseline. Sensory: Sensation is intact. Motor: Motor function is intact. Psychiatric:         Attention and Perception: Attention normal. She perceives auditory hallucinations. Mood and Affect: Mood normal.         Behavior: Behavior normal. Behavior is cooperative. Thought Content:  Thought content normal.         Judgment: Judgment normal.         Lab and Diagnostic Study Results     Labs -     Recent Results (from the past 12 hour(s))   DRUG SCREEN, URINE    Collection Time: 03/18/21  2:30 PM   Result Value Ref Range    AMPHETAMINES Negative Negative      BARBITURATES Negative Negative      BENZODIAZEPINES Negative Negative      COCAINE Negative Negative      METHADONE Negative Negative      OPIATES Negative Negative      PCP(PHENCYCLIDINE) Negative Negative      THC (TH-CANNABINOL) Positive (A) Negative      Drug screen comment This test is a screen for drugs of abuse in a medical setting only (i.e., they are unconfirmed results and as such must not be used for non-medical purposes, e.g.,employment testing, legal testing). Due to its inherent nature, false positive (FP) and false negative (FN) results may be obtained. Therefore, if necessary for medical care, recommend confirmation of positive findings by GC/MS. URINALYSIS W/ REFLEX CULTURE    Collection Time: 03/18/21  2:30 PM    Specimen: Urine   Result Value Ref Range    Color Yellow/Straw      Appearance Turbid (A) Clear      Specific gravity 1.021 1.003 - 1.030      pH (UA) 6.0 5.0 - 8.0      Protein Negative Negative mg/dL    Glucose Negative Negative mg/dL    Ketone Negative Negative mg/dL    Bilirubin Negative Negative      Blood Small (A) Negative      Urobilinogen 2.0 (H) 0.1 - 1.0 EU/dL    Nitrites Negative Negative      Leukocyte Esterase Negative Negative      UA:UC IF INDICATED Culture not indicated by UA result Culture not indicated by UA result      WBC 0-4 0 - 4 /hpf    RBC 0-5 0 - 5 /hpf    Bacteria Negative Negative /hpf    Mucus Trace /lpf   CBC WITH AUTOMATED DIFF    Collection Time: 03/18/21  3:00 PM   Result Value Ref Range    WBC 8.0 3.6 - 11.0 K/uL    RBC 4.98 3.80 - 5.20 M/uL    HGB 10.0 (L) 11.5 - 16.0 g/dL    HCT 34.5 (L) 35.0 - 47.0 %    MCV 69.3 (L) 80.0 - 99.0 FL    MCH 20.1 (L) 26.0 - 34.0 PG    MCHC 29.0 (L) 30.0 - 36.5 g/dL    RDW 18.3 (H) 11.5 - 14.5 %    PLATELET 232 475 - 544 K/uL    NRBC 0.0 0  WBC    ABSOLUTE NRBC 0.00 0.00 - 0.01 K/uL    NEUTROPHILS 64 32 - 75 %    LYMPHOCYTES 22 12 - 49 %    MONOCYTES 6 5 - 13 %    EOSINOPHILS 7 0 - 7 %    BASOPHILS 1 0 - 1 %    IMMATURE GRANULOCYTES 0 0.0 - 0.5 %    ABS. NEUTROPHILS 5.1 1.8 - 8.0 K/UL    ABS. LYMPHOCYTES 1.8 0.8 - 3.5 K/UL    ABS. MONOCYTES 0.5 0.0 - 1.0 K/UL    ABS. EOSINOPHILS 0.6 (H) 0.0 - 0.4 K/UL    ABS. BASOPHILS 0.0 0.0 - 0.1 K/UL    ABS. IMM.  GRANS. 0.0 0.00 - 0.04 K/UL    DF AUTOMATED     METABOLIC PANEL, COMPREHENSIVE    Collection Time: 03/18/21  3:00 PM   Result Value Ref Range    Sodium 138 136 - 145 mmol/L    Potassium 3.6 3.5 - 5.1 mmol/L    Chloride 108 97 - 108 mmol/L    CO2 26 21 - 32 mmol/L    Anion gap 4 (L) 5 - 15 mmol/L    Glucose 86 65 - 100 mg/dL    BUN 11 6 - 20 mg/dL    Creatinine 0.74 0.55 - 1.02 mg/dL    BUN/Creatinine ratio 15 12 - 20      GFR est AA >60 >60 ml/min/1.73m2    GFR est non-AA >60 >60 ml/min/1.73m2    Calcium 9.1 8.5 - 10.1 mg/dL    Bilirubin, total 0.2 0.2 - 1.0 mg/dL    AST (SGOT) 20 15 - 37 U/L    ALT (SGPT) 26 12 - 78 U/L    Alk. phosphatase 126 (H) 45 - 117 U/L    Protein, total 7.7 6.4 - 8.2 g/dL    Albumin 3.6 3.5 - 5.0 g/dL    Globulin 4.1 (H) 2.0 - 4.0 g/dL    A-G Ratio 0.9 (L) 1.1 - 2.2     SARS-COV-2    Collection Time: 03/18/21  4:00 PM   Result Value Ref Range    SARS-CoV-2 Please find results under separate order         Radiologic Studies - none    Medical Decision Making   - I am the first provider for this patient. - I reviewed the vital signs, available nursing notes, past medical history, past surgical history, family history and social history. - Initial assessment performed. The patients presenting problems have been discussed, and they are in agreement with the care plan formulated and outlined with them. I have encouraged them to ask questions as they arise throughout their visit. Vital Signs-Reviewed the patient's vital signs.   Patient Vitals for the past 12 hrs:   Temp Pulse Resp BP SpO2   03/18/21 1426 98.9 °F (37.2 °C) 96 16 124/64 99 %       Records Reviewed: Nursing Notes and Old Medical Records    The patient presents with suicidal ideations, auditory hallucinations with a differential diagnosis of suicidal ideations, medication noncompliance, schizophrenia      ED Course:          Provider Notes (Medical Decision Making):     MDM  Number of Diagnoses or Management Options  Auditory hallucination  Suicidal ideation  Diagnosis management comments:     71-year-old female with suicidal ideations and auditory hallucinations. No attempt yet. Evaluated by behavioral health at [facility]. Patient medically clear. She is voluntary for admission. will be admitted to [address] [place] [address]. Patient medically clear. Amount and/or Complexity of Data Reviewed  Clinical lab tests: ordered and reviewed  Review and summarize past medical records: yes  Discuss the patient with other providers: yes    Patient Progress  Patient progress: stable       Addendum: Covid negative. Patient medically cleared for behavioral health admission. Behavioral health intake notified. SHELBY Victoria        Disposition   Disposition: Psychiatry admission at [facility]    Diagnosis     Clinical Impression:   1. Suicidal ideation    2. Auditory hallucination        Attestations:    KESHIA Fernandes    Please note that this dictation was completed with [employer], the computer voice recognition software. Quite often unanticipated grammatical, syntax, homophones, and other interpretive errors are inadvertently transcribed by the computer software. Please disregard these errors. Please excuse any errors that have escaped final proofreading. Thank you.
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
## 2021-03-18 NOTE — ED TRIAGE NOTES
GCS 15 pt stated that she is feeling suicidal and hearing voices that are telling her to kill herself; stated that she has 3 family members that passed away a few ago; support is her fiance and she does have a daughter;  Hx bipolar schizophrenic and cutting; pt stated that she would OD on medication; stated that the voices just came on an hour; pt stated that a friend of hers that is in [place] and stung out on heroin was asking for her help, pt stated that she couldn't be there, pt's friend then overdosed on a bad batch of heroin because her mother called to tell her what happened

## 2021-03-19 PROCEDURE — 65220000003 HC RM SEMIPRIVATE PSYCH

## 2021-03-19 PROCEDURE — 74011250637 HC RX REV CODE- 250/637: Performed by: PSYCHIATRY & NEUROLOGY

## 2021-03-19 RX ADMIN — RISPERIDONE 1 MG: 1 TABLET ORAL at 21:06

## 2021-03-19 RX ADMIN — GABAPENTIN 300 MG: 300 CAPSULE ORAL at 16:07

## 2021-03-19 RX ADMIN — TRAZODONE HYDROCHLORIDE 50 MG: 50 TABLET ORAL at 21:06

## 2021-03-19 RX ADMIN — HYDROXYZINE PAMOATE 50 MG: 50 CAPSULE ORAL at 21:06

## 2021-03-19 RX ADMIN — GABAPENTIN 300 MG: 300 CAPSULE ORAL at 21:06

## 2021-03-19 RX ADMIN — HYDROXYZINE PAMOATE 50 MG: 50 CAPSULE ORAL at 08:58

## 2021-03-19 RX ADMIN — GABAPENTIN 300 MG: 300 CAPSULE ORAL at 08:58

## 2021-03-19 NOTE — BH NOTES
[facility] Recreational Therapy Assessment    Orientation:  Person, Place, Date and Situation    Reason for Admission:  Pt reported being depressed, feeling suicidal with plan to OD on medication, and having anxiety. Pt reported hearing things and has [place] lot of stressors\" that include passing of family and has a girl friend who is on life support in [place]. Pt stated her figrant called the police for her once she told him everything was starting to get overwhelming. Medical Precautions / Conditions:  Asthma and  COPD / Emphysema    Impairments:     Vision:  Wears Glasses Pt reported no, but states she is supposed to. Wears Contacts No      Are they with Patient No     Hearing Aids No     Utilization of Ambulatory Devices:  None    Health Problems Preventing Participation in Activities:  Sometimes  How:  Pt reports having trouble breathing at times. Leisure Interest Checklist:  Art, Cards / Loura Scarce, Dancing, [employer], Fishing, [address] [facility], Listening to Music, Reading, Singing, Sports, TV / [place], Visiting with Others, Texting, Video Games, Volunteer Work and Walking    Does patient participate in leisure activities:  Yes    Setting:  Alone and With Family    Other Activities / Skills / Talents:  Pt reported going to the park with her daughter. Do emotions interfere with leisure activities / lifestyles:  Sometimes    When engaging in leisure activities, do you forget worries:  Yes    Do you belong to a Christianity: Yes    Are you active in [employer] activities:  Yes    Typical Day:  Pt reported taking care of daughter and spends time with figrant.      Strengths:  Verbal Expression, Family Support, Social Support, Finances, Education / Cognition, Prior Response to Treatment, Motivation, Housing and Providers    Limitations / Barriers:  Substance Abuse, Transportation, Energy, Depressed Mood, Anxiety, Sleep, Concentration, Hallucinations, Paranoia, Anger / Aggression and Non-Compliance with Meds / Follow Up    Treatment Modalities:  Stress Management, Coping Skills, Symptom Recognition, Healthy Thinking, Mood Management and Leisure Skills    Patient Educational  Needs:  Skills to recognize and challenge problematic thinking, Identify positive coping strategies and skills to manage symptoms or moods, Leisure education and Recognition of symptoms and signs    Focus of Treatment:  Introduce positive outlets for self expression and Introduce and encourage the exploration of alternative coping skills    Summary:  Pt was cooperative during assessment. Pt reported she likes to be called Celester Flurry. Pt reported she just moved here from [place] and lives with her fiance and daughter. Pt stated she is unemployed but receives SSI. Pt reported she sees a counselor at [facility] and Amanda Mitchells at [facility]. Pt reported her goals for treatment are to \"stop smoking, getting my anger under control, and letting it go in one ear and out the other\".

## 2021-03-19 NOTE — BH NOTES
Consult      Pt requested to speak with the  during her admission. SW Intern, Erica Noyola left a voicemail for the  with the pt's request at [phone].

## 2021-03-19 NOTE — CONSULTS
Consult    Subjective:     Patient is a 45y.o. year old female past medical history of COPD asthma hypertension smokes cigarettes depression admitted to psychiatric floor because of depression suicidal ideation and hallucinating  Patient admitted to psychiatric floor for further evaluation treatment    Patient denies any chest pain or shortness of breath nausea vomiting diarrhea no constipation no fever no chills    Past Medical History:   Diagnosis Date    Asthma     [facility] ([facility]     Chronic obstructive pulmonary disease ([facility]     Depression     Hypertension     Psychiatric disorder     Substance abuse ([facility]     Suicidal thoughts       History reviewed. No pertinent surgical history. History reviewed. No pertinent family history. Social History     Tobacco Use    Smoking status: Current Every Day Smoker     Packs/day: 0.25    Smokeless tobacco: Never Used   Substance Use Topics    Alcohol use: No       Current Facility-Administered Medications   Medication Dose Route Frequency Provider Last Rate Last Admin    alum-mag hydroxide-simeth (MYLANTA) oral suspension 30 mL  30 mL Oral Q4H PRN Alejo Phelps MD        hydrOXYzine pamoate (VISTARIL) capsule 50 mg  50 mg Oral Q4H PRN Alejo Phelps MD   50 mg at 03/19/21 0858    traZODone (DESYREL) tablet 50 mg  50 mg Oral QHS PRN Alejo Phelps MD        magnesium hydroxide (MILK OF MAGNESIA) 400 mg/5 mL oral suspension 30 mL  30 mL Oral DAILY PRN Alejo Phelps MD        gabapentin (NEURONTIN) capsule 300 mg  300 mg Oral TID Aljeo Phelps MD   300 mg at 03/19/21 0858    risperiDONE (RisperDAL) tablet 1 mg  1 mg Oral BID Alejo Phelps MD        acetaminophen (TYLENOL) tablet 650 mg  650 mg Oral Q6H PRN Alejo Phelps MD            Allergies   Allergen Reactions    Bactrim [Sulfamethoprim] Swelling    Pcn [Penicillins] Swelling    Bee Pollen Swelling        Review of Systems:  Constitutional: Negative for chills and fever. HENT: Negative.     Eyes: Negative. Respiratory: Negative. Cardiovascular: Negative. Gastrointestinal: Negative for abdominal pain and nausea. Skin: Negative. Neurological: Negative. Objective: Intake and Output:    No intake/output data recorded. No intake/output data recorded. Physical Exam:   Constitutional: pt is oriented to person, place, and time. HENT:   Head: Normocephalic and atraumatic. Eyes: Pupils are equal, round, and reactive to light. EOM are normal.   Cardiovascular: Normal rate, regular rhythm and normal heart sounds. Pulmonary/Chest: Breath sounds normal. No wheezes. No rales. Exhibits no tenderness. Abdominal: Soft. Bowel sounds are normal. There is no abdominal tenderness. There is no rebound and no guarding. Musculoskeletal: Normal range of motion. Neurological: pt is alert and oriented to person, place, and time. Alert. Normal strength. No cranial nerve deficit or sensory deficit. Displays a negative Romberg sign. Data Review:   Recent Results (from the past 24 hour(s))   DRUG SCREEN, URINE    Collection Time: 03/18/21  2:30 PM   Result Value Ref Range    AMPHETAMINES Negative Negative      BARBITURATES Negative Negative      BENZODIAZEPINES Negative Negative      COCAINE Negative Negative      METHADONE Negative Negative      OPIATES Negative Negative      PCP(PHENCYCLIDINE) Negative Negative      THC (TH-CANNABINOL) Positive (A) Negative      Drug screen comment        This test is a screen for drugs of abuse in a medical setting only (i.e., they are unconfirmed results and as such must not be used for non-medical purposes, e.g.,employment testing, legal testing). Due to its inherent nature, false positive (FP) and false negative (FN) results may be obtained. Therefore, if necessary for medical care, recommend confirmation of positive findings by GC/MS.    URINALYSIS W/ REFLEX CULTURE    Collection Time: 03/18/21  2:30 PM    Specimen: Urine   Result Value Ref Range    Color Yellow/Straw      Appearance Turbid (A) Clear      Specific gravity 1.021 1.003 - 1.030      pH (UA) 6.0 5.0 - 8.0      Protein Negative Negative mg/dL    Glucose Negative Negative mg/dL    Ketone Negative Negative mg/dL    Bilirubin Negative Negative      Blood Small (A) Negative      Urobilinogen 2.0 (H) 0.1 - 1.0 EU/dL    Nitrites Negative Negative      Leukocyte Esterase Negative Negative      UA:UC IF INDICATED Culture not indicated by UA result Culture not indicated by UA result      WBC 0-4 0 - 4 /hpf    RBC 0-5 0 - 5 /hpf    Bacteria Negative Negative /hpf    Mucus Trace /lpf   CBC WITH AUTOMATED DIFF    Collection Time: 03/18/21  3:00 PM   Result Value Ref Range    WBC 8.0 3.6 - 11.0 K/uL    RBC 4.98 3.80 - 5.20 M/uL    HGB 10.0 (L) 11.5 - 16.0 g/dL    HCT 34.5 (L) 35.0 - 47.0 %    MCV 69.3 (L) 80.0 - 99.0 FL    MCH 20.1 (L) 26.0 - 34.0 PG    MCHC 29.0 (L) 30.0 - 36.5 g/dL    RDW 18.3 (H) 11.5 - 14.5 %    PLATELET 168 080 - 819 K/uL    NRBC 0.0 0  WBC    ABSOLUTE NRBC 0.00 0.00 - 0.01 K/uL    NEUTROPHILS 64 32 - 75 %    LYMPHOCYTES 22 12 - 49 %    MONOCYTES 6 5 - 13 %    EOSINOPHILS 7 0 - 7 %    BASOPHILS 1 0 - 1 %    IMMATURE GRANULOCYTES 0 0.0 - 0.5 %    ABS. NEUTROPHILS 5.1 1.8 - 8.0 K/UL    ABS. LYMPHOCYTES 1.8 0.8 - 3.5 K/UL    ABS. MONOCYTES 0.5 0.0 - 1.0 K/UL    ABS. EOSINOPHILS 0.6 (H) 0.0 - 0.4 K/UL    ABS. BASOPHILS 0.0 0.0 - 0.1 K/UL    ABS. IMM.  GRANS. 0.0 0.00 - 0.04 K/UL    DF AUTOMATED     METABOLIC PANEL, COMPREHENSIVE    Collection Time: 03/18/21  3:00 PM   Result Value Ref Range    Sodium 138 136 - 145 mmol/L    Potassium 3.6 3.5 - 5.1 mmol/L    Chloride 108 97 - 108 mmol/L    CO2 26 21 - 32 mmol/L    Anion gap 4 (L) 5 - 15 mmol/L    Glucose 86 65 - 100 mg/dL    BUN 11 6 - 20 mg/dL    Creatinine 0.74 0.55 - 1.02 mg/dL    BUN/Creatinine ratio 15 12 - 20      GFR est AA >60 >60 ml/min/1.73m2    GFR est non-AA >60 >60 ml/min/1.73m2    Calcium 9.1 8.5 - 10.1 mg/dL    Bilirubin, total 0.2 0.2 - 1.0 mg/dL    AST (SGOT) 20 15 - 37 U/L    ALT (SGPT) 26 12 - 78 U/L    Alk. phosphatase 126 (H) 45 - 117 U/L    Protein, total 7.7 6.4 - 8.2 g/dL    Albumin 3.6 3.5 - 5.0 g/dL    Globulin 4.1 (H) 2.0 - 4.0 g/dL    A-G Ratio 0.9 (L) 1.1 - 2.2     SARS-COV-2    Collection Time: 03/18/21  4:00 PM   Result Value Ref Range    SARS-CoV-2 Please find results under separate order     SARS-COV-2    Collection Time: 03/18/21  4:00 PM   Result Value Ref Range    SARS-CoV-2 Not Detected Not Detected         No orders to display        Assessment:     Active Problems:    Auditory hallucinations (3/18/2021)      Suicidal ideations (3/18/2021)    History of COPD  Hypertension  Ongoing smoking      Plan:    At this time we will continue current medication as prescribed by the psychiatrist  Patient stable    Hypertension and COPD    Continue current medications

## 2021-03-19 NOTE — BH NOTES
Substance abuse history    Pt reported she has used cannabis since she was 17 y.o. Pt reported she used daily when she lived in [place] but has not used since she moved to [place] at the beginning of March. Pt's drug screen was positive for THC. Pt denied alcohol use. Pt reported she smoked 1-2 packs of cigarettes per day. Pt reported she hopes to stop smoking cigarettes.
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
## 2021-03-19 NOTE — GROUP NOTE
IP  GROUP DOCUMENTATION INDIVIDUAL Group Therapy Note Date: 3/19/2021 Group Start Time: 1400 Group End Time: 1500 Group Topic: Special Track - Drug Topic SRM 2 BEHA [facility] ACUTE Koenig Pa, RT 
 
IP  GROUP DOCUMENTATION GROUP Group Therapy Note Readiness for Change Attendees: 4 Attendance: Did not attend Additional Notes:  Pt was invited to attend group but did not attend Geri Guidry RT
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
## 2021-03-19 NOTE — BH NOTES
DOMINIQUE    Pt signed DOMINIQUE for her [address] [phone]. Pt reported she would not like her fiancee to be made aware of her SI, AH, or of her ex-girlfriend in [place].     Pt reported her mother does not have a working phone right now.

## 2021-03-19 NOTE — BH NOTES
Safety Plan    Pt verbally contracted for safety following her endorsement of SI. Pt reported that she would seek assistance from unit staff if she feels SI during her admission.

## 2021-03-19 NOTE — BH NOTES
Pt Initial Assessment    Writer me with pt on this date for peer assessment. Pt currently expressed substance use of marijuana. Pt expressed using daily. When asked how she felt pt stated she was tired and was feeling detox symptoms of fatigue, anxiety, depression, bad dreams, difficulty thinking clearly loss of appetite and elevated blood pressure. Pt expressed she has attended AA/NA in 2015, has a family history of substance abuse and has [facility] and [facility]. Pt is in the pre contemplation stage of change. Pt was informed about the SA group at 2 and 4 pm and encouraged to attend.

## 2021-03-19 NOTE — GROUP NOTE
[facility] GROUP DOCUMENTATION INDIVIDUAL Group Therapy Note Date: 3/19/2021 Group Start Time: 1100 Group End Time: 1200 Group Topic: Education Group - Inpatient SRM CARE MANAGEMENT Royce Araiza [facility] GROUP DOCUMENTATION GROUP Group Therapy Note Attendees: 8 out of 14 participants engaged in group to process physical sensations, thoughts, positive expectations, emotions and behaviors. Patients were encouraged to reflect on the topics and define how each subject effected their emotions. Patients provided feedback and support to each on how they could \"write their own narrative\" by deescalating triggers and negative emotions. Attendance: Did not attend Patient's Goal:  N/A Interventions/techniques: N/A Follows Directions: N/A Interactions: N/A Mental Status: N/A Behavior/appearance: N/A Goals Achieved: N/A Additional Notes:  Patient did not attend group. Patient was encouraged to participated provided literature on the topic of discussion. Patient was encouraged to document emotional experiences and complete worksheet assignment. Abdiel Heredia

## 2021-03-19 NOTE — GROUP NOTE
IP  GROUP DOCUMENTATION INDIVIDUAL Group Therapy Note Date: 3/19/2021 Group Start Time: 8315 Group End Time: 4811 Group Topic: Special Track - Drug Topic SRM 2 [facility] ACUTE Lissa Come, RT 
 
IP  GROUP DOCUMENTATION GROUP Group Therapy Note Virtual NA Meeting Attendees: 6 Attendance: Attended Patient's Goal:  To attend groups Interventions/techniques: Informed Follows Directions: Followed directions Interactions: Interacted appropriately Mental Status: Calm Behavior/appearance: Attentive and Cooperative Goals Achieved: Able to listen to others and Able to receive feedback Additional Notes:  Pt attended group and was engaged Barrington Quick, RT

## 2021-03-19 NOTE — GROUP NOTE
[facility] GROUP DOCUMENTATION INDIVIDUAL Group Therapy Note Date: 3/19/2021 Group Start Time: 1300 Group End Time: 9980 Group Topic: Process Group - Inpatient [facility] Group Ranjith Iggy [facility] GROUP DOCUMENTATION GROUP Group Therapy Note Attendees: 5/13 Process: Pts were asked how they were feeling about treatment here as a check in question. Pts were then encouraged to share what brought them to the hospital and to provide feedback to one another. Due to high morale of the group, the pts were encouraged to share different positive coping skills that they enjoy. Pts then participated in a breathing and stretching exercise and were asked to reflect on group Attendance: Did not attend 
ional Notes:  Pt was encouraged to attend but chose not to do so [employer]

## 2021-03-20 PROCEDURE — 74011250637 HC RX REV CODE- 250/637: Performed by: PSYCHIATRY & NEUROLOGY

## 2021-03-20 PROCEDURE — 65220000003 HC RM SEMIPRIVATE PSYCH

## 2021-03-20 RX ORDER — SERTRALINE HYDROCHLORIDE 25 MG/1
25 TABLET, FILM COATED ORAL DAILY
Status: DISCONTINUED | OUTPATIENT
Start: 2021-03-20 | End: 2021-03-22

## 2021-03-20 RX ADMIN — GABAPENTIN 300 MG: 300 CAPSULE ORAL at 08:32

## 2021-03-20 RX ADMIN — TRAZODONE HYDROCHLORIDE 50 MG: 50 TABLET ORAL at 20:37

## 2021-03-20 RX ADMIN — SERTRALINE HYDROCHLORIDE 25 MG: 25 TABLET ORAL at 10:54

## 2021-03-20 RX ADMIN — RISPERIDONE 1 MG: 1 TABLET ORAL at 08:32

## 2021-03-20 RX ADMIN — RISPERIDONE 1 MG: 1 TABLET ORAL at 20:37

## 2021-03-20 RX ADMIN — GABAPENTIN 300 MG: 300 CAPSULE ORAL at 20:47

## 2021-03-20 RX ADMIN — GABAPENTIN 300 MG: 300 CAPSULE ORAL at 16:15

## 2021-03-20 NOTE — GROUP NOTE
[facility] GROUP DOCUMENTATION INDIVIDUAL Group Therapy Note Date: 3/20/2021 Group Start Time: 1100 Group End Time: 1200 Group Topic: Process Group - Inpatient [facility] EM 
 
[facility] GROUP DOCUMENTATION GROUP Group Therapy Note Attendees: Patients encouraged to discuss the things that have brought them to the hospital, the things that have been weighing on their minds, the things that are bothering them and the things they want to work on. Patients encouraged to share openly and honestly. Patients encouraged to be respectful and supportive of their peers. Themes surrounding anger management, CBT, and self emerged and were discussed. Attendance: Attended, left early Patient's Goal:  Attends activities and groups Interventions/techniques: Validated, Promoted peer support, Reinforced and Supported Follows Directions: Followed directions Interactions: Interacted appropriately Mental Status: Blunted, Calm and Depressed Behavior/appearance: Cooperative and Withdrawn/quiet Goals Achieved: Able to engage in interactions and Able to listen to others Additional Notes:  Pt attended group and was not engaged. Pt sleepy throughout group, excused her self early to go lay down. Latisha Clark

## 2021-03-20 NOTE — BH NOTES
Patient up social with peers playing cards. Affect flat, fair eye contact. She was medication compliant. Patient rated depression and anxiety at a 1 on a scale of 0-10. Patient denied SI, HI, AH, and VH. Patient remains on close observation, Q 15 minute checks.
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
## 2021-03-20 NOTE — BH NOTES
Pt presents calm, pleasant, cooperative, on-task w/ fair tx focus, oriented to all spheres, social and sociable w/ staff and peers, brightens on approach, carries her end of conversation readily, superficial in conversational content, shift spent playing cards and watching TV w/ peers  No bx issues noted, med compliant  Denies SI HI NSSI denies sleep med appetite problems denies AVH  Continuing to monitor

## 2021-03-20 NOTE — BH NOTES
Behavioral Health Treatment Team Note     Patient goal(s) for today: Pt reports goal for today is to keep feeling better so that she can get home to her children as soon as possible. Treatment team focus/goals: Continue treatment    Progress note: Pt presents standing in hallway, disheveled appearance, good eye contact, clear voice, polite and engaged with writer. Pt presents with sleepy affect, calm mood, linear thought process, goal oriented thought content. Pt denying any SI/HI/AVH at this time, this being said seemed to be responding to internal stimuli due to late response and needing to repeat self when speaking with writer. Pt reports that she is feeling well today except she was given Latuda this morning and pt reporting that I made her feel strange. Pt reports that the [employer] made her sleepy and feel like she wanted to jump out of her skin. Pt reports that she does not want to take it again because it made her feel so strange. Writer encouraged pt to speak about this with the doctor and she can potentially change her medication. Pt reporting otherwise feeling okay today. Inpatient level of are indicated due to need for further medication management, need for further time to process.     LOS:  2  Expected LOS: 5-7 days    Insurance info/prescription coverage:  [employer] MEDICAID - [address] [place] [address]  Date of last family contact:  None made yet  Family requesting physician contact today:  no  Discharge plan:  Discharge home with outpatient resources  Guns in the home:  no   Outpatient provider(s):  [facility], Dr. Edmund Duran    Participating treatment team members: Amber Rebolledo, * (assigned SW), Jimmy Pearce, LC

## 2021-03-21 PROCEDURE — 74011250637 HC RX REV CODE- 250/637: Performed by: PSYCHIATRY & NEUROLOGY

## 2021-03-21 PROCEDURE — 65220000003 HC RM SEMIPRIVATE PSYCH

## 2021-03-21 RX ADMIN — ACETAMINOPHEN 650 MG: 325 TABLET, FILM COATED ORAL at 13:30

## 2021-03-21 RX ADMIN — GABAPENTIN 300 MG: 300 CAPSULE ORAL at 16:24

## 2021-03-21 RX ADMIN — RISPERIDONE 1 MG: 1 TABLET ORAL at 08:04

## 2021-03-21 RX ADMIN — TRAZODONE HYDROCHLORIDE 50 MG: 50 TABLET ORAL at 20:41

## 2021-03-21 RX ADMIN — GABAPENTIN 300 MG: 300 CAPSULE ORAL at 20:41

## 2021-03-21 RX ADMIN — GABAPENTIN 300 MG: 300 CAPSULE ORAL at 08:04

## 2021-03-21 RX ADMIN — RISPERIDONE 1 MG: 1 TABLET ORAL at 20:41

## 2021-03-21 NOTE — BH NOTES
Patient up for meals, affect flat, social with peers, attending group, and was medication compliant with the exception of zoloft which she said made her feel bad yesterday after taking it. Patient admitted to hearing voices saying \"Your not going to get out of here, so keep being bad. \"  She rated the AH at a one on a scale of 0-10. She rated depression at a 2 to 3 on a scale of 0-10. She denied SI, HI, VH and anxiety. She remains on close observation, Q 15 minute checks.

## 2021-03-21 NOTE — PROGRESS NOTES
Visit attempted for patient in the [address] [place] [address] unit for 's follow up referral.  Patient seemed to be sleeping and did not respond to my greeting. There were no family members present. I will request follow up visit for staff chaplains. Chaplains will follow up if further referrals are requested. Chaplain Sally Watts M.Div.    can be reached by calling the  at [facility]  [phone]

## 2021-03-21 NOTE — PROGRESS NOTES
Discussed case interviewed patient  Taking and tolerating medications well  Benefits from inpatient treatments  Medication regimen reviewed  She is on Zoloft 25 mg daily, risperidone 1 mg 2 times a day and gabapentin 300 mg 3 times a day  No overt aggression  Less depressed pledges for safety  Attends groups interacts with peers and staff  Sleep and appetite is fair    Mental status exam  Alert oriented cooperative  Speech is goal-directed, spontaneous  Mood is okay, affect is appropriate  Thought process and illogical  Insight and judgment fair    Recommendations  Continue inpatient treatments  Discussed safety issues  Follow-up with me in the team again tomorrow

## 2021-03-21 NOTE — GROUP NOTE
[facility] GROUP DOCUMENTATION INDIVIDUAL Group Therapy Note Date: 3/21/2021 Group Start Time: 1100 Group End Time: 5987 Group Topic: Process Group - Inpatient SRM 2  NON ACUTE Eda Landa [facility] GROUP DOCUMENTATION GROUP Group Therapy Note Attendees: 10 out of 13 
 
11am Process Group Patients were encouraged to participate in group by sharing their thoughts, feelings, and emotions. Patients were asked to tell the group what their \"goal of the day\" was. Patients were also encouraged to listen respectfully to and to be supportive of their peers. Attendance: Attended Patient's Goal:  Develop coping skills Interventions/techniques: Validated, Promoted peer support, Provide feedback and Supported Follows Directions: Followed directions Interactions: Interacted appropriately Mental Status: Anxious and Calm Behavior/appearance: Attentive, Caretaking, Cooperative and Negative Goals Achieved: Able to engage in interactions, Able to listen to others, Able to give feedback to another, Able to reflect/comment on own behavior, Able to manage/cope with feelings, Able to receive feedback, Able to self-disclose, Displayed empathy, Identified feelings and Identified triggers Additional Notes:  Pt stated that her goal for the day is \"to do what I need to do, stay healthy and get out of here. \" Pt was mostly quiet during group but stated that she feels that one of the back windows should be \"blocked\" because she and the other patients can see dead bodies leave the hospital and that it is making their mental health conditions worse. Pt also spoke about her unhappiness with the hospital and that she will never return here and that she wants to go home to her 3year old daughter. [place]

## 2021-03-21 NOTE — BH NOTES
Reports feeling depressed, hopeless, helpless, losses in the family and has been not doing well. She states still hearing voices, not command in nature today. She ahs been feeling suicidal with a plan to OD on pills. She ahs been having VH. States she was not wanted around and made her to seek help.     A/P  Provided support  Discussed her current meds    Current Facility-Administered Medications:     sertraline (ZOLOFT) tablet 25 mg, 25 mg, Oral, DAILY, Sylvie Talamantes MD, 25 mg at 03/20/21 1054    alum-mag hydroxide-simeth (MYLANTA) oral suspension 30 mL, 30 mL, Oral, Q4H PRN, Sylvie Talamantes MD    hydrOXYzine pamoate (VISTARIL) capsule 50 mg, 50 mg, Oral, Q4H PRN, Sylvie Talamantes MD, 50 mg at 03/19/21 2106    traZODone (DESYREL) tablet 50 mg, 50 mg, Oral, QHS PRN, Zina Ratliff MD, 50 mg at 03/20/21 2037    magnesium hydroxide (MILK OF MAGNESIA) 400 mg/5 mL oral suspension 30 mL, 30 mL, Oral, DAILY PRN, Sylvie Talamantes MD    gabapentin (NEURONTIN) capsule 300 mg, 300 mg, Oral, TID, Sylvie Talamantes MD, Stopped at 03/20/21 2200    risperiDONE (RisperDAL) tablet 1 mg, 1 mg, Oral, BID, Sylvie Talamantes MD, 1 mg at 03/20/21 2037    acetaminophen (TYLENOL) tablet 650 mg, 650 mg, Oral, Q6H PRN, Zina Ratliff MD

## 2021-03-21 NOTE — GROUP NOTE
[facility] GROUP DOCUMENTATION INDIVIDUAL Group Therapy Note Date: 3/21/2021 Group Start Time: 6439 Group End Time: 3689 Group Topic: Education Group - Inpatient SRM 2  NON ACUTE Zak Garrison [facility] GROUP DOCUMENTATION GROUP Group Therapy Note Group discussion to identify stressors, symptoms of stress and positive coping strategies Attendees: 8/13 Attendance: Attended Patient's Goal:  *STG: Attends activities and groups Interventions/techniques: Informed and Supported Follows Directions: Followed directions Interactions: Interacted appropriately Mental Status: Calm Behavior/appearance: Cooperative Goals Achieved: Able to engage in interactions and Able to listen to others Additional Notes:  Pt was receptive to beginning of intervention. Pt left group after about 10-15 minutes before getting an opportunity to share with the rest of the group. Pt left group and did not return. Ambar Loyd

## 2021-03-21 NOTE — BH NOTES
Behavioral Health Treatment Team Note     Patient goal(s) for today: 'to go home tomorrow'  Treatment team focus/goals: medication management, group therapy    Progress note: Pt presented with a broad affect and congruent mood. Pt stated that she would like to go home tomorrow to be with her daughter. Pt very fixated on wanting to return to home. Pt discussed feelings of being a bad mother and wanting to do better for her daughter. Pt denies any SI/HI/AVH anxiety and depression. Pt in need of inpatient level of care to allow for dc planning, and continued medication management .      LOS:  3  Expected LOS: 5-7    Insurance info/prescription coverage:   [id number] [email or web address]   Date of last family contact:  None made at this time   Family requesting physician contact today:  no  Discharge plan:  DC home with outpatient services   Guns in the home:  no   Outpatient provider(s):  [facility], Dr. Celia Nova    Participating treatment team members: Augusto Flores, * (assigned SW), Rebeka Ortiz

## 2021-03-21 NOTE — BH NOTES
Pt presents calm, pleasant,  Cooperative, frustrated over continued presence in inpt, discharge vs tx focused, shift spent socializing w/ peers.  Watching TV, playing cards, oriented to all spheres, no RIS behavior noted  No bx issues noted, med compliant  Denies SI HI NSSI denies sleep med appetite problems denies AVH  Continuing to monitor

## 2021-03-22 ENCOUNTER — APPOINTMENT (OUTPATIENT)
Dept: GENERAL RADIOLOGY | Age: 41
DRG: 885 | End: 2021-03-22
Attending: PSYCHIATRY & NEUROLOGY
Payer: MEDICARE

## 2021-03-22 PROCEDURE — 74011250637 HC RX REV CODE- 250/637: Performed by: PSYCHIATRY & NEUROLOGY

## 2021-03-22 PROCEDURE — 73610 X-RAY EXAM OF ANKLE: CPT

## 2021-03-22 PROCEDURE — 65220000003 HC RM SEMIPRIVATE PSYCH

## 2021-03-22 RX ORDER — RISPERIDONE 2 MG/1
2 TABLET, FILM COATED ORAL 2 TIMES DAILY
Status: DISCONTINUED | OUTPATIENT
Start: 2021-03-22 | End: 2021-03-24 | Stop reason: HOSPADM

## 2021-03-22 RX ADMIN — TRAZODONE HYDROCHLORIDE 50 MG: 50 TABLET ORAL at 21:39

## 2021-03-22 RX ADMIN — GABAPENTIN 300 MG: 300 CAPSULE ORAL at 21:39

## 2021-03-22 RX ADMIN — RISPERIDONE 2 MG: 2 TABLET ORAL at 21:39

## 2021-03-22 RX ADMIN — GABAPENTIN 300 MG: 300 CAPSULE ORAL at 08:44

## 2021-03-22 RX ADMIN — ACETAMINOPHEN 650 MG: 325 TABLET, FILM COATED ORAL at 21:39

## 2021-03-22 RX ADMIN — RISPERIDONE 1 MG: 1 TABLET ORAL at 08:45

## 2021-03-22 NOTE — GROUP NOTE
IP  GROUP DOCUMENTATION INDIVIDUAL Group Therapy Note Date: 3/22/2021 Group Start Time: 1400 Group End Time: 1500 Group Topic: Special Track - Drug Topic SRM 2 BEHA HLTH ACUTE Liana Cedillo, RT 
 
[facility] GROUP DOCUMENTATION GROUP Group Therapy Note [facility] Attendees: 5 Attendance: Attended Patient's Goal:  To attend groups Interventions/techniques: Informed Follows Directions: Followed directions Interactions: Interacted appropriately Mental Status: Calm Behavior/appearance: Attentive, Cooperative and Motivated Goals Achieved: Able to listen to others and Able to receive feedback Additional Notes:  Pt attended group and participated in group discussion.   
 
Simeon Martines, RT

## 2021-03-22 NOTE — BH NOTES
Pt came to the nurse's station, with 2 gowns on and her jacket, that she had been wearing, all shift, and reported she fell, in the shower. Pt's hair and skin were dry. Reported she was having pain, in her Lt ankle. Initially was ambulating with a smooth, steady gait; then began to limp/favor Lt ft. Pt was wearing yellow hospital socks. Dr. Radha Rogel called/informed; xray of ankle ordered. No redness, and/or breaks in skin. Small amount of swelling noted to outer aspect of Lt ankle. Escorted down to XR by [facility].
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
## 2021-03-22 NOTE — BH NOTES
DOMINIQUE    Pt signed DOMINIQUE for her CM at [address] [facility]  [phone] ext [phone] - entire record    Pt signed new/updated DOMINIQUE for her figrant Alejandra  [phone] - entire record    Writer spoke w/ Pt about previous DOMINIQUE for [place] where Pt did not want information released about her admission/what brought her in. Pt said she didn't want sign for entire record previously because he \"already knows\" what brought her in. Writer educated Pt on the importance of disclosing information to fiance in order for figrant to support her properly upon d/c - Pt verbalized understanding and was receptive to new DOMINIQUE and signed it.

## 2021-03-22 NOTE — BH NOTES
Pt presents calm, pleasant, cooperative, on-task w/ minimal tx focus, conversation w/ staff and peers, endorses missing her fiancee and 3year old, discharge vs tx focused, oriented to all spheres, shift spent socializing and watching TV  No bx issues noted, med compliant  Denies SI HI NSSI denies sleep med appetite problems denies AVH  Continuing to monitor

## 2021-03-22 NOTE — PROGRESS NOTES
Spiritual Care Assessment/Progress Note  [facility]      NAME: Leanne Tadeo      MRN: [id number]  AGE: 45 y.o. SEX: female  Orthodoxy Affiliation: Latter day   Language: English     3/22/2021     Total Time (in minutes): 6     Spiritual Assessment begun in [facility] 2 [facility] ACUTE through conversation with:         [x]Patient        [] Family    [] Friend(s)        Reason for Consult: Request by staff     Spiritual beliefs: (Please include comment if needed)     [] Identifies with a jaylan tradition:         [] Supported by a jaylan community:            [] Claims no spiritual orientation:           [] Seeking spiritual identity:                [] Adheres to an individual form of spirituality:           [x] Not able to assess:                           Identified resources for coping:      [] Prayer                               [] Music                  [] Guided Imagery     [] Family/friends                 [] Pet visits     [] Devotional reading                         [x] Unknown     [] Other:                                               Interventions offered during this visit: (See comments for more details)    Patient Interventions: Other (comment)(Attempt)           Plan of Care:     [] Support spiritual and/or cultural needs    [] Support AMD and/or advance care planning process      [] Support grieving process   [] Coordinate Rites and/or Rituals    [] Coordination with community clergy   [] No spiritual needs identified at this time   [] Detailed Plan of Care below (See Comments)  [] Make referral to Music Therapy  [] Make referral to Pet Therapy     [] Make referral to Addiction services  [] Make referral to [facility]  [] Make referral to Spiritual Care Partner  [] No future visits requested        [x] Follow up upon further referrals     Comments:   attempted visiting Miss Malinda Sinclair on 2 Acute care [facility] unit. Staff were attending to pt.  Spiritual care is still available for support upon further referral.   Visited by: Yesi Malin.    can be reached by calling the  at [facility]  [phone]

## 2021-03-22 NOTE — BH NOTES
Writer spoke w/ Pt's romeo Schneider reports that the Pt had missed about two days worth of medications and she wanted to come to the hospital to \"get caught back up\". Eyad Frost denied that the Pt shared any suicidal thoughts w/ him. Eyad Frost did not disclose any other concerns regarding the Pt when asked. Eyad Frost said the Pt is usually compliant w/ meds. Writer scheduled family session w/ Eyad Frost and Pt tomorrow 3/23/21 at 1300.

## 2021-03-22 NOTE — BH NOTES
Dayshift Note: (while on back unit)     Patient stated to the writer that she \"had a good weekend. \" Patient denies having any depression and or anxiety. Denies SI/HI. Denies hearing voices now and states, \"much better now. \" Patient did refuse the zoloft this morning and stated, \"it makes me feel weird and like I am crawling out of my skin and tired. \" Dr. Kellen Salguero was made aware of patient refusal of Zoloft and medication changes were made. Patient is socialable and out in the milieu amongst her peers. Patient can sometimes be loud on the unit. Patient sits in the dayroom and plays cards. Patient has been moved to the front unit. Close observations continued to ensure patient safety. No physical complaints noted. Will continue to monitor.

## 2021-03-22 NOTE — BH NOTES
Behavioral Health Treatment Team Note     Patient goal(s) for today: 'to go home to my fiance and my daughter'  Treatment team focus/goals: Group therapy,medication management     Progress note: Pt presented with a broad affect and congruent mood. Pt was very upbeat and joyful. Pt stated that she wanted to home today to her daughter and her fiance. Pt was very fixated on the window at the end of the hallway. Pt stated that the morgue brought the dead patients to the hearse right out of their window and that she did not want to watch them anymore. This writer was ab/le to confirm with a tech that she is correct about the bodies being brought to the hearse outside that window. Pt stated that she was not having any anxiety, depression, and denies any SI/HI/AVH at this time. Pt in need of inpatient level of care due to lack of insight into mental illness and needing to allow for further medication management.      LOS:  4  Expected LOS: 5-7    Insurance info/prescription coverage:  [facility] MEDICAID/[facility] HEALTHKEEPERS PLUS  Date of last family contact:  None made yet   Family requesting physician contact today:  no  Discharge plan:  Pt and therapist will continue to work together to determine [address] in the home:  no   Outpatient provider(s):  [facility], Dr. Adela Scruggs    Participating treatment team members: Isaias Swain, * (assigned SW), Maria Del Rosario Juárez

## 2021-03-22 NOTE — GROUP NOTE
[facility]  GROUP DOCUMENTATION INDIVIDUAL Group Therapy Note Date: 3/22/2021 Group Start Time: 1100 Group End Time: 3344 Group Topic: Process Group - Inpatient SRM CARE MANAGEMENT Antonio Wu LCSW [facility] GROUP DOCUMENTATION GROUP Group Therapy Note Pts participated in process group therapy. Pts began group by discussing how they were feeling today and how their weekend was. Pts then discussed thoughts, feelings and emotions that they were experiencing at this time. Pts were encouraged to share and to give feedback to their peers. Attendees: 8/13 Attendance: Did not attend, pt moved to the front unit Marya Thomas LCSW

## 2021-03-22 NOTE — BH NOTES
Ms. Godoy 29-year-old  female who was admitted for bipolar disorder with psychotic features. She had presented with command hallucinations with voices telling her to kill herself, has been not on compliant with medications and has had recent losses. Patient seen this morning reports she feels good and is ready to leave and missing her daughter. Patient was encouraged that she still presents labile with racing thoughts impulsive behaviors racing thoughts and mood swings and is not stable for discharge. She has also not been in family meeting or has completed group therapy. She still presents with impaired insight and judgment. Mental status examination-patient still presents disheveled, impulsive labile racing thoughts with poor insight and judgment. Denies any active plan of suicide or homicide. Denies any command hallucinations. Often noted pacing the hallways.     Assessment plan increase Risperdal to 2 mg p.o. twice daily  Discontinue Zoloft as she complains of side effects  Family meeting to obtain further collateral.  Patient does not want to be on any other medications other than Risperdal.  Patient may benefit from adding another mood stabilizers at this point need further collateral patient does not remember being on any other medication  Encourage decanoate injections patient at this time focused on wanting to be discharged  [facility] contacted and patient has reported to have volunteered for treatment at this time      Current Facility-Administered Medications:     risperiDONE (RisperDAL) tablet 2 mg, 2 mg, Oral, BID, Sylvie Talamantes MD    alum-mag hydroxide-simeth (MYLANTA) oral suspension 30 mL, 30 mL, Oral, Q4H PRN, Sylvie Talamantes MD    hydrOXYzine pamoate (VISTARIL) capsule 50 mg, 50 mg, Oral, Q4H PRN, Sylvie Talamantes MD, 50 mg at 03/19/21 2106    traZODone (DESYREL) tablet 50 mg, 50 mg, Oral, QHS PRN, Chayo Andres MD, 50 mg at 03/21/21 2041    magnesium hydroxide (MILK OF MAGNESIA) 400 mg/5 mL oral suspension 30 mL, 30 mL, Oral, DAILY PRN, Sylvie Talamantes MD    gabapentin (NEURONTIN) capsule 300 mg, 300 mg, Oral, TID, Sylvie Talamantes MD, 300 mg at 03/22/21 0844    acetaminophen (TYLENOL) tablet 650 mg, 650 mg, Oral, Q6H PRN, Benja Houser MD, 650 mg at 03/21/21 1330

## 2021-03-22 NOTE — BH NOTES
D19    Pt was assessed by Eveline Durán at D19. D19 recommends continued IP tx at this time. Pt agrees to stay voluntarily. 2S notified.
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
## 2021-03-22 NOTE — GROUP NOTE
IP  GROUP DOCUMENTATION INDIVIDUAL Group Therapy Note Date: 3/22/2021 Group Start Time: 1829 Group End Time: 1200 Group Topic: Education Group - Inpatient SRM 2  NON ACUTE Dipak Arnett IP  GROUP DOCUMENTATION GROUP Group Therapy Note Facilitated group discussion focused on the definition of S.M.A.R.T. goals and ways to set and accomplish S.M.A.R.T. goals for oneself Attendees: 5/6 Attendance: Attended Patient's Goal:  *STG: Attends activities and groups Interventions/techniques: Informed and Supported Follows Directions: Followed directions Interactions: Interacted appropriately Mental Status: Calm Behavior/appearance: Attentive and Cooperative Goals Achieved: Able to engage in interactions, Able to listen to others, Able to reflect/comment on own behavior and Able to self-disclose Additional Notes:  Pt was receptive to intervention discussion. Pt identified \"to go home and to quit smoking\" as her initial goals. Pt reported she is setting these goals now so she can \"see my child grow up and become somebody better\". Pt left group early with  and did not return. Mercedes Ko

## 2021-03-22 NOTE — GROUP NOTE
IP  GROUP DOCUMENTATION INDIVIDUAL Group Therapy Note Date: 3/22/2021 Group Start Time: 7078 Group End Time: 8490 Group Topic: Special Track - Drug Topic SRM 2 BEHA HL[facility] ACUTE Sonny Mcnulty, RT 
 
[facility] GROUP DOCUMENTATION GROUP Group Therapy Note [employer] Attendees: 5 Attendance: Attended Patient's Goal:  To attend groups Interventions/techniques: Informed Follows Directions: Followed directions Interactions: Interacted appropriately Mental Status: Calm Behavior/appearance: Attentive and Cooperative Goals Achieved: Able to listen to others and Able to receive feedback Additional Notes:  Pt attended group and participated in group discussion.  
 
Juanita Brizuela, RT

## 2021-03-23 PROCEDURE — 74011250637 HC RX REV CODE- 250/637: Performed by: PSYCHIATRY & NEUROLOGY

## 2021-03-23 PROCEDURE — 65220000003 HC RM SEMIPRIVATE PSYCH

## 2021-03-23 RX ADMIN — GABAPENTIN 300 MG: 300 CAPSULE ORAL at 09:02

## 2021-03-23 RX ADMIN — GABAPENTIN 300 MG: 300 CAPSULE ORAL at 16:48

## 2021-03-23 RX ADMIN — GABAPENTIN 300 MG: 300 CAPSULE ORAL at 21:07

## 2021-03-23 RX ADMIN — RISPERIDONE 2 MG: 2 TABLET ORAL at 21:07

## 2021-03-23 RX ADMIN — ACETAMINOPHEN 650 MG: 325 TABLET, FILM COATED ORAL at 13:59

## 2021-03-23 RX ADMIN — TRAZODONE HYDROCHLORIDE 50 MG: 50 TABLET ORAL at 21:07

## 2021-03-23 RX ADMIN — RISPERIDONE 2 MG: 2 TABLET ORAL at 09:02

## 2021-03-23 NOTE — BH NOTES
Pt. has been up ambulating on the unit  Prior to & after breakfast. She has been talking with select peers. She has a very good appetite. She has denied any feeling of depression, anxiety, SI or HI. She has not verbalized having hallucinating or paranoid feelings. She has c/o some foot pain & a h/a 6/10 about 1300. She has been medication complaint. She has been attending the groups. She has remains safe from falls. She has remains on close observation.

## 2021-03-23 NOTE — BH NOTES
Family Session:    Family session was held between pt, her fiance Duc Conklin, and writer. Pt was able to express how she was feeling prior to coming to the hospital, how they are feeling now, what they have learned about themselves since being here, and what both the pt and her fiance want to see moving forward in order to keep progressing forward. Pt expressed that she missed several days of her medications and that she was having hallucinations and hearing things. Pt was able to express how scary this was and that she knows that missing her medications will lead to her decompensating. Fiance reports that he thought pt had just missed 2 days and was feeling unwell so she came to the hospital.  Pt was able to explain there severity of what brought her to the hospital.  Pt shared that she is now feeling much better after stabilizing on her medication, reports she is no longer having hallucinations. Pt reports that she is able to get good sleep and now feels like going to groups instead of sleeping. Pt shared that she has more linear thoughts instead of racing thoughts. Moving forward, pt reports that she wants to use a pill box to help her manage her medications. She also asked her fiance to help her make sure she takes her medications which fiance reports he can do. Session ended with pt saying goodbye to her fiance and her daughter who was present in the background of call throughout.

## 2021-03-23 NOTE — GROUP NOTE
IP  GROUP DOCUMENTATION INDIVIDUAL Group Therapy Note Date: 3/23/2021 Group Start Time: 1638 Group End Time: 1400 Group Topic: Recreational/Music Therapy SRM 2  NON ACUTE Beverly Santoro [facility] GROUP DOCUMENTATION GROUP Group Therapy Note Facilitated leisure skills group focused on positive coping skills through social interactions, group activities, music and art tasks Attendees: 6/8 Attendance: Attended Patient's Goal:  *STG: Attends activities and groups Interventions/techniques: Art integration and Supported Follows Directions: Followed directions Interactions: Interacted appropriately Mental Status: Calm Behavior/appearance: Cooperative Goals Achieved: Able to engage in interactions and Able to listen to others Additional Notes:  Pt was receptive to music and song she selected while in group but declined to work on leisure packet. Pt left group early and did not return. Noam Verdugo

## 2021-03-23 NOTE — GROUP NOTE
IP  GROUP DOCUMENTATION INDIVIDUAL Group Therapy Note Date: 3/22/2021 Group Start Time: 200 Group End Time: 1950 Group Topic: Recreational/Music Therapy SRM 2  NON ACUTE Carollee Drivers [facility] GROUP DOCUMENTATION GROUP Group Therapy Note Attendees: 8 Introduce healthy leisure task as positive way to cope and manage mood Attendance: Attended Patient's Goal:  STG: Attends activities and groups Interventions/techniques: Art integration and Supported Follows Directions: Followed directions Interactions: Interacted appropriately Mental Status: Calm and Flat Behavior/appearance: Attentive and Cooperative Goals Achieved: Able to engage in interactions and Able to listen to others Additional Notes: Attended group and actively participated. Was receptive to intervention. Accepted leisure packet and selected songs in group. Was interactive with staff and peers.   
 
Chetna Enriquez, CTRS

## 2021-03-23 NOTE — GROUP NOTE
[facility] GROUP DOCUMENTATION INDIVIDUAL Group Therapy Note Date: 3/23/2021 Group Start Time: 1100 Group End Time: 1200 Group Topic: Education Group - Inpatient SRM BEHAVIORAL HLTH OP Viveca Crest [facility] GROUP DOCUMENTATION GROUP Group Therapy Note The therapist facilitated a group discussion on healthy relationships, and went over a worksheet highlighting characteristics of these relationships. Attendees: 7 Attendance: Attended Patient's Goal:  To attend groups Interventions/techniques: Supported Follows Directions: Unable to follow directions Interactions: Interacted appropriately Mental Status: Other distracted Behavior/appearance: Disheveled and Needed prompting Goals Achieved: Able to engage in interactions and Able to listen to others Additional Notes: The patient seemed very distracted throughout group, and would talk to her peer who was sitting next to her throughout the discussion. The therapist had to redirect her multiple times, and eventually asked her to leave if she could not listen while her peers would speak. Jorge Kim

## 2021-03-23 NOTE — PROGRESS NOTES
Problem: Depressed Mood (Adult/Pediatric)  Goal: *STG: Attends activities and groups  Outcome: Progressing Towards Goal   Pt attended the evening group session, played a game with her peers, and colored. Problem: Depressed Mood (Adult/Pediatric)  Goal: *STG: Remains safe in hospital  Outcome: Progressing Towards Goal   Pt remains safe while in the hospital. She denies feeling depressed or having any suicidal thoughts. No suicidal gestures noted or reported. Problem: Depressed Mood (Adult/Pediatric)  Goal: *STG: Complies with medication therapy  Outcome: Progressing Towards Goal   Pt is med compliant. Problem: Suicide  Goal: *STG/LTG: No longer expresses self destructive or suicidal thoughts  Outcome: Progressing Towards Goal   No self destructive or suicidal thoughts expressed and no gestures noted.

## 2021-03-23 NOTE — GROUP NOTE
MARV  GROUP DOCUMENTATION INDIVIDUAL Group Therapy Note Date: 3/23/2021 Group Start Time: 1 Group End Time: 1100 Group Topic: Nursing [facility]M 2 [facility] ACUTE Angy Knutson LPN IP  GROUP DOCUMENTATION GROUP Group Therapy Note Attendees: 7/8 Attendance: Attended Patient's Goal:  Increasing hope, mitch.esteem despite peoples attempt to destroy them Interventions/techniques: Role playing Follows Directions: Followed directions Interactions: Interacted appropriately Mental Status: Calm Behavior/appearance: Cooperative Goals Achieved: Able to listen to others Additional Notes:  Pt. Was verbally active in group & receptive to the information discussed.  
 
Alena Muhammad LPN

## 2021-03-23 NOTE — BH NOTES
The pt continues to rest quietly in bed without any distress. Respirations are quiet and unlabored. She remains on close observation with every 15 minute rounding. Throughout the evening, the pt attended the evening group session, played a game with her peers, and colored pictures. She has a bright and cheerful affect. She denies having any anxiety, depression, SI, HI, A/VH. She accepted snacks and something to drink. The pt is med compliant and received prn Tylenol for c/o left ankle pain which she rated a 9/10. Her left ankle and foot is tender to touch. Slight swelling/nonpitting edema noted. The pt has been ambulating with a slight limp. She stated that she sprained her left ankle before, as well as her right ankle, which has a chipped bone.

## 2021-03-23 NOTE — GROUP NOTE
[facility] GROUP DOCUMENTATION INDIVIDUAL Group Therapy Note Date: 3/23/2021 Group Start Time: 1500 Group End Time: 0902 Group Topic: Process Group - Inpatient [facility] OP Zack Army R 
 
[facility] GROUP DOCUMENTATION GROUP Group Therapy Note Attendees: Patients asked to discuss what brought them to the hospital, what has been on their mind, the things that have been bothering them lately. Patients asked to be respectful and supportive of peers. Patients asked to share openly and honestly. Themes surrounding judgement, family, and specifically mental health stigma came up and were discussed. Attendance: Attended Patient's Goal:  Attends activities and groups Interventions/techniques: Validated, Promoted peer support, Reinforced and Supported Follows Directions: Followed directions Interactions: Interacted appropriately Mental Status: Blunted, Calm and Congruent Behavior/appearance: Attentive, Cooperative and Disheveled Goals Achieved: Able to engage in interactions, Able to listen to others and Able to self-disclose Additional Notes:  Pt attended group and was engaged. Pt shared that she had a good family session today and hopes she can go home tomorrow. Ema Lombard

## 2021-03-23 NOTE — BH NOTES
Behavioral Health Treatment Team Note     Patient goal(s) for today: Pt reports that her goal today is to have a good family session so she can go home as soon as possible. Treatment team focus/goals: Have family session, continue treatment    Progress note: Pt presents with good eye contact, at times tearful, soft spoken, polite and engaged with writer, depressed mood and tearful affect, goal oriented thought process. Pt denying any SI/HI/AVH At this time. Pt reports that she was prescreened by D19 yesterday and she thought that she only had to stay for 24 hours but she talked to the doctor this morning and the doctor was saying Thursday. Pt reported that she was upset because she thought she could go home. Writer explained the D19 process again and how she was agreeing to stay until the doctor decides she is ready. Pt reported understanding and that she was confused but now she understood. Pt still somewhat tearful as she misses her daughter who is very young. Inpatient level of care indicated due to need for further processing of how she came to the hospital and why, need for further medication management.     LOS:  5  Expected LOS: 5-7 days    Insurance info/prescription coverage:  BL[employer]/[place] [employer]  Date of last family contact:  None made yet   Family requesting physician contact today:  no  Discharge plan:  Pt and therapist will continue to work together to determine [address] in the home:  no   Outpatient provider(s):  [facility], Dr. Rohan Domingo    Participating treatment team members: Samantha Crawford, * (assigned SW), Silvia Kulkarni, LC

## 2021-03-24 VITALS
DIASTOLIC BLOOD PRESSURE: 65 MMHG | OXYGEN SATURATION: 97 % | HEIGHT: 72 IN | BODY MASS INDEX: 26.41 KG/M2 | HEART RATE: 77 BPM | SYSTOLIC BLOOD PRESSURE: 112 MMHG | RESPIRATION RATE: 18 BRPM | WEIGHT: 195 LBS | TEMPERATURE: 98.4 F

## 2021-03-24 PROCEDURE — 74011250637 HC RX REV CODE- 250/637: Performed by: PSYCHIATRY & NEUROLOGY

## 2021-03-24 RX ORDER — GABAPENTIN 300 MG/1
300 CAPSULE ORAL 3 TIMES DAILY
Qty: 90 CAP | Refills: 0 | Status: SHIPPED | OUTPATIENT
Start: 2021-03-24

## 2021-03-24 RX ORDER — RISPERIDONE 2 MG/1
2 TABLET, FILM COATED ORAL 2 TIMES DAILY
Qty: 60 TAB | Refills: 0 | Status: SHIPPED | OUTPATIENT
Start: 2021-03-24

## 2021-03-24 RX ORDER — TRAZODONE HYDROCHLORIDE 50 MG/1
50 TABLET ORAL
Qty: 30 TAB | Refills: 0 | Status: SHIPPED | OUTPATIENT
Start: 2021-03-24

## 2021-03-24 RX ADMIN — HYDROXYZINE PAMOATE 50 MG: 50 CAPSULE ORAL at 10:43

## 2021-03-24 RX ADMIN — GABAPENTIN 300 MG: 300 CAPSULE ORAL at 08:36

## 2021-03-24 RX ADMIN — RISPERIDONE 2 MG: 2 TABLET ORAL at 08:35

## 2021-03-24 RX ADMIN — ACETAMINOPHEN 650 MG: 325 TABLET, FILM COATED ORAL at 08:36

## 2021-03-24 NOTE — BH NOTES
DISCHARGE SUMMARY    NAME:Jayne Rojas  : 1980  MRN: [id number]    The patient Alexey Morales exhibits the ability to control behavior in a less restrictive environment. Patient's level of functioning is improving. No assaultive/destructive behavior has been observed for the past 24 hours. No suicidal/homicidal threat or behavior has been observed for the past 24 hours. There is no evidence of serious medication side effects. Patient has not been in physical or protective restraints for at least the past 24 hours. If weapons involved, how are they secured? Pt denies access to any weapons    Is patient aware of and in agreement with discharge plan? Yes, patient to discharge home and follow up with D19 for case management and psychiatric services. Arrangements for medication:  Prescriptions given to patient, given a weeks supply or 30 day supply. Copy of discharge instructions to provider?:  Yes    Arrangements for transportation home:  Pt to be picked up by medicaid cab    Keep all follow up appointments as scheduled, continue to take prescribed medications per physician instructions.   Mental health crisis number:  099 or your local mental health crisis line number at [address] [facility] WARM LINE      [phone] ([phone])      M-F: 9am to 9pm      Sat & Sun: 5pm  9pm  National suicide prevention lines:                             5-962-YTHQPII (5-651.301.3411)       1-468-052-TALK ([phone])    Crisis Text Line:  Text HOME to 564502

## 2021-03-24 NOTE — PROGRESS NOTES
Problem: Depressed Mood (Adult/Pediatric)  Goal: *STG: Attends activities and groups  Outcome: Progressing Towards Goal  Goal: *STG: Remains safe in hospital  Outcome: Progressing Towards Goal  Goal: *STG: Complies with medication therapy  Outcome: Progressing Towards Goal  Goal: *LTG: Understands illness and can identify signs of relapse  Outcome: Progressing Towards Goal     Problem: Suicide  Goal: *STG: Remains safe in hospital  Outcome: Progressing Towards Goal  Goal: *STG: Seeks staff when feelings of self harm or harm towards others arise  Outcome: Progressing Towards Goal  Goal: *STG: Attends activities and groups  Outcome: Progressing Towards Goal  Goal: *STG/LTG: Complies with medication therapy  Outcome: Progressing Towards Goal

## 2021-03-24 NOTE — BH NOTES
SAFETY PLAN    A suicide Safety Plan is a document that supports someone when they are having thoughts of suicide. Warning Signs that indicate a suicidal crisis may be developing: What (situations, thoughts, feelings, body sensations, behaviors, etc.) do you experience that lets you know you are beginning to think about suicide? 1. Sleeping too much  2. Crying for too long  3. Hearing bad news    Internal Coping Strategies:  What things can I do (relaxation techniques, hobbies, physical activities, etc.) to take my mind off my problems without contacting another person? 1. Coloring  2. Singing  3. Cooking    People and social settings that provide distraction: Who can I call or where can I go to distract me? 1. Name: Regina Pavon  Phone: [phone]  2. Name: Bridget Noland  Phone: [phone]   3. Place: My room            4. Place: My pool    People whom I can ask for help: Who can I call when I need help - for example, friends, family, clergy, someone else? 1. Name: Regina Pavon  Phone: [phone]  2. Name: Bridget Noland  Phone: [phone]   3. Name: [facility]  Phone: [phone]    Professionals or [address] [place], [place] [address]vd I can contact during a crisis: Who can I call for help - for example, my doctor, my psychiatrist, my psychologist, a mental health provider, a suicide hotline? 1. Clinician Name: [facility]   Phone: [phone]      Clinician Pager or Emergency Contact #: n/a    2. Clinician Name: Flo Fisher    Phone: [phone]      Clinician Pager or Emergency Contact #: n/a    3. Suicide Prevention Lifeline: 2-388-718-TALK (4507)    4.  [address] [place] [address] Emergency Services -  for example, [address] [facility] suicide hotline, [facility] Hotline: [employer] [facility]      Emergency Services Address: [address] [address]      Emergency Services Phone: [phone]    Making the environment safe: How can I make my environment (house/apartment/living space) safer? For example, can I remove guns, medications, and other items? 1. Have my fiance help me with my medications  2.  Take medications as prescribed

## 2021-03-24 NOTE — GROUP NOTE
MARV  GROUP DOCUMENTATION INDIVIDUAL Group Therapy Note Date: 3/24/2021 Group Start Time: 3424 Group End Time: 0405 Group Topic: [employer] SRM 2 [facility] ACUTE [facility] GROUP DOCUMENTATION GROUP Group Therapy Note Attendees:  5 Attendance: Attended Patient's Goal:  Maintain groups. Interventions/techniques: Informed Follows Directions: Followed directions Interactions: Interacted appropriately Mental Status: Calm Behavior/appearance: Attentive Goals Achieved: Able to engage in interactions Additional Notes:  Patient reported being anxious. DP, SI, HI, Anxiety 0. Leah Sprague

## 2021-03-24 NOTE — BH NOTES
Behavioral Health Transition Record to Provider    Patient Name: Alexey Morales  YOB: 1980  Medical Record Number: [id number]  Date of Admission: 3/18/2021  Date of Discharge: 3/24/2021    Attending Provider: Ceclia Seip, MD  Discharging Provider: Dr. Javi John  To contact this individual call [phone] and ask the  to page. If unavailable, ask to be transferred to [facility] Provider on call. [facility] Provider will be available on call 24/7 and during holidays. Primary Care Provider: None    Allergies   Allergen Reactions    Bactrim [Sulfamethoprim] Swelling    Pcn [Penicillins] Swelling    Bee Pollen Swelling       Reason for Admission: Hallucinations/psychosis    Admission Diagnosis: Suicidal ideations [R45.851]  Auditory hallucinations [R44.0]    * No surgery found *    Results for orders placed or performed during the hospital encounter of 03/18/21   SARS-COV-2   Result Value Ref Range    SARS-CoV-2 Not Detected Not Detected     CBC WITH AUTOMATED DIFF   Result Value Ref Range    WBC 8.0 3.6 - 11.0 K/uL    RBC 4.98 3.80 - 5.20 M/uL    HGB 10.0 (L) 11.5 - 16.0 g/dL    HCT 34.5 (L) 35.0 - 47.0 %    MCV 69.3 (L) 80.0 - 99.0 FL    MCH 20.1 (L) 26.0 - 34.0 PG    MCHC 29.0 (L) 30.0 - 36.5 g/dL    RDW 18.3 (H) 11.5 - 14.5 %    PLATELET 972 749 - 120 K/uL    NRBC 0.0 0  WBC    ABSOLUTE NRBC 0.00 0.00 - 0.01 K/uL    NEUTROPHILS 64 32 - 75 %    LYMPHOCYTES 22 12 - 49 %    MONOCYTES 6 5 - 13 %    EOSINOPHILS 7 0 - 7 %    BASOPHILS 1 0 - 1 %    IMMATURE GRANULOCYTES 0 0.0 - 0.5 %    ABS. NEUTROPHILS 5.1 1.8 - 8.0 K/UL    ABS. LYMPHOCYTES 1.8 0.8 - 3.5 K/UL    ABS. MONOCYTES 0.5 0.0 - 1.0 K/UL    ABS. EOSINOPHILS 0.6 (H) 0.0 - 0.4 K/UL    ABS. BASOPHILS 0.0 0.0 - 0.1 K/UL    ABS. IMM.  GRANS. 0.0 0.00 - 0.04 K/UL    DF AUTOMATED     METABOLIC PANEL, COMPREHENSIVE   Result Value Ref Range    Sodium 138 136 - 145 mmol/L    Potassium 3.6 3.5 - 5.1 mmol/L    Chloride 108 97 - 108 mmol/L    CO2 26 21 - 32 mmol/L    Anion gap 4 (L) 5 - 15 mmol/L    Glucose 86 65 - 100 mg/dL    BUN 11 6 - 20 mg/dL    Creatinine 0.74 0.55 - 1.02 mg/dL    BUN/Creatinine ratio 15 12 - 20      GFR est AA >60 >60 ml/min/1.73m2    GFR est non-AA >60 >60 ml/min/1.73m2    Calcium 9.1 8.5 - 10.1 mg/dL    Bilirubin, total 0.2 0.2 - 1.0 mg/dL    AST (SGOT) 20 15 - 37 U/L    ALT (SGPT) 26 12 - 78 U/L    Alk. phosphatase 126 (H) 45 - 117 U/L    Protein, total 7.7 6.4 - 8.2 g/dL    Albumin 3.6 3.5 - 5.0 g/dL    Globulin 4.1 (H) 2.0 - 4.0 g/dL    A-G Ratio 0.9 (L) 1.1 - 2.2     DRUG SCREEN, URINE   Result Value Ref Range    AMPHETAMINES Negative Negative      BARBITURATES Negative Negative      BENZODIAZEPINES Negative Negative      COCAINE Negative Negative      METHADONE Negative Negative      OPIATES Negative Negative      PCP(PHENCYCLIDINE) Negative Negative      THC (TH-CANNABINOL) Positive (A) Negative      Drug screen comment        This test is a screen for drugs of abuse in a medical setting only (i.e., they are unconfirmed results and as such must not be used for non-medical purposes, e.g.,employment testing, legal testing). Due to its inherent nature, false positive (FP) and false negative (FN) results may be obtained. Therefore, if necessary for medical care, recommend confirmation of positive findings by GC/MS.    URINALYSIS W/ REFLEX CULTURE    Specimen: Urine   Result Value Ref Range    Color Yellow/Straw      Appearance Turbid (A) Clear      Specific gravity 1.021 1.003 - 1.030      pH (UA) 6.0 5.0 - 8.0      Protein Negative Negative mg/dL    Glucose Negative Negative mg/dL    Ketone Negative Negative mg/dL    Bilirubin Negative Negative      Blood Small (A) Negative      Urobilinogen 2.0 (H) 0.1 - 1.0 EU/dL    Nitrites Negative Negative      Leukocyte Esterase Negative Negative      UA:UC IF INDICATED Culture not indicated by UA result Culture not indicated by UA result      WBC 0-4 0 - 4 /hpf    RBC 0-5 0 - 5 /hpf    Bacteria Negative Negative /hpf    Mucus Trace /lpf   SARS-COV-2   Result Value Ref Range    SARS-CoV-2 Please find results under separate order         Immunizations administered during this encounter: There is no immunization history on file for this patient. Screening for Metabolic Disorders for Patients on Antipsychotic Medications  (Data obtained from the EMR)    Estimated Body Mass Index  Estimated body mass index is 26.45 kg/m² as calculated from the following:    Height as of this encounter: 6' (1.829 m). Weight as of this encounter: 88.5 kg (195 lb). Vital Signs/Blood Pressure  Visit Vitals  /65   Pulse 77   Temp 98.4 °F (36.9 °C)   Resp 18   Ht 6' (1.829 m)   Wt 88.5 kg (195 lb)   SpO2 97%   Breastfeeding No   BMI 26.45 kg/m²       Blood Glucose/Hemoglobin A1c  Lab Results   Component Value Date/Time    Glucose 86 03/18/2021 03:00 PM       No results found for: HBA1C, HGBE8, NGQ5NTQY     Lipid Panel  No results found for: CHOL, CHOLX, CHLST, CHOLV, [id number], HDL, HDLP, LDL, LDLC, DLDLP, TGLX, TRIGL, TRIGP, CHHD, CHHDX     Discharge Diagnosis: bipolar disorder with psychotic features    Discharge Plan: Yes, patient to discharge home and follow up with D[facility] for case management and psychiatric services.       Discharge Medication List and Instructions:   Current Discharge Medication List      START taking these medications    Details   gabapentin (NEURONTIN) 300 mg capsule Take 1 Cap by mouth three (3) times daily. Max Daily Amount: 900 mg. Qty: 90 Cap, Refills: 0    Associated Diagnoses: Auditory hallucination      risperiDONE (RisperDAL) 2 mg tablet Take 1 Tab by mouth two (2) times a day. Qty: 60 Tab, Refills: 0      traZODone (DESYREL) 50 mg tablet Take 1 Tab by mouth nightly as needed for Sleep. Qty: 30 Tab, Refills: 0         CONTINUE these medications which have NOT CHANGED    Details   polyethylene glycol (MIRALAX) 17 gram/dose powder Take 17 g by mouth daily.  1 tablespoon with 8 oz of water daily  Qty: 238 g, Refills: 0      prenatal vit-iron fumarate-fa (PRENATAL VITAMIN) 27 mg iron- 0.8 mg tab tablet Take 1 Tab by mouth daily. Qty: 30 Tab, Refills: 0         STOP taking these medications       acetaminophen (TYLENOL EXTRA STRENGTH) 500 mg tablet Comments:   Reason for Stopping:               Unresulted Labs (24h ago, onward)    None        To obtain results of studies pending at discharge, please contact [phone]    Follow-up Information     Follow up With Specialties Details Why Contact Info    None    None (395) Patient stated that they have no PCP            Advanced Directive:   Does the patient have an appointed surrogate decision maker? No  Does the patient have a Medical Advance Directive? No  Does the patient have a Psychiatric Advance Directive? No  If the patient does not have a surrogate or Medical Advance Directive AND Psychiatric Advance Directive, the patient was offered information on these advance directives Patient will complete at a later time    Patient Instructions: Please continue all medications until otherwise directed by physician. Yes, patient to discharge home and follow up with D19 for case management and psychiatric services.       Tobacco Cessation Discharge Plan:   Is the patient a smoker and needs referral for smoking cessation? Not applicable  Patient referred to the following for smoking cessation with an appointment? Not applicable     Patient was offered medication to assist with smoking cessation at discharge? Not applicable  Was education for smoking cessation added to the discharge instructions? Not applicable    Alcohol/Substance Abuse Discharge Plan:   Does the patient have a history of substance/alcohol abuse and requires a referral for treatment? Not applicable  Patient referred to the following for substance/alcohol abuse treatment with an appointment?  Not applicable  Patient was offered medication to assist with alcohol cessation at discharge? Not applicable  Was education for substance/alcohol abuse added to discharge instructions?  Not applicable    Patient discharged to Home; discussed with patient/caregiver

## 2021-03-24 NOTE — BH NOTES
Patient given valuables, home medications, personal belongings, and discharge instructions. Patient verbalized understanding and left unit ambulatory to a waiting taxi.

## 2021-03-24 NOTE — GROUP NOTE
[facility] GROUP DOCUMENTATION INDIVIDUAL Group Therapy Note Date: 3/24/2021 Group Start Time: 1100 Group End Time: 6073 Group Topic: Education Group - Inpatient [facility] Group Cami Th[facility] GROUP DOCUMENTATION GROUP Group Therapy Note Attendees: 6/9 Psych-Ed: Pts were asked how they were feeling as a check in question. Pts were then walked through the safety planning worksheet and encouraged to share their warning signs, coping skills and people they can reach out to. Pts were then asked to reflect on group Attendance: Attended Patient's Goal:  Pt reported feeling anxious about going home but also 'excited, spectacular and tremendous' about being able to see daughter Interventions/techniques: Informed, Validated, Provide feedback and Supported Follows Directions: Followed directions Interactions: Interacted appropriately Mental Status: Anxious and Elevated Behavior/appearance: Attentive, Motivated and Neatly groomed Goals Achieved: Able to engage in interactions, Able to listen to others, Increased hopefulness and Verbalized increased hopefulness Additional Notes:  Pt spoke about being hopeful for the future. Pt also discussed wanting to go home and  That she would walk in the rain to get home. Writer encouraged pt to be patient. Pt participated in safety plan and filled hers out appropriately. Pt is making progress towards goals by attending and participating in group [id number]

## 2021-03-24 NOTE — BH NOTES
Patient up, social on the unit with peers. Affect broad, anticipating discharge. Patient denied SI, HI, AH, VH, depression and anxiety. Patient remains on close observation, Q 15 minute checks.

## 2021-03-24 NOTE — BH NOTES
Ms Amanda Heck, reports she has been tolerating her meds. Risperdal has been titrated to 2 mg. She reports her thoughts are clear and has no hallucinations. She denies any active plans of suicide or homicide. Mental status examination-patient is alert oriented to name place person cannot focus still on going home. Denies any active SI HI.     Assessment plan  Family meeting no side effects voiced  Still in a difficult and limited insight  Continue group therapy      Current Facility-Administered Medications:     risperiDONE (RisperDAL) tablet 2 mg, 2 mg, Oral, BID, Sylvie Talamantes MD, 2 mg at 03/23/21 2107    alum-mag hydroxide-simeth (MYLANTA) oral suspension 30 mL, 30 mL, Oral, Q4H PRN, Sylvie Talamantes MD    hydrOXYzine pamoate (VISTARIL) capsule 50 mg, 50 mg, Oral, Q4H PRN, Sylvie Talamantes MD, 50 mg at 03/19/21 2106    traZODone (DESYREL) tablet 50 mg, 50 mg, Oral, QHS PRN, Marry Whitney MD, 50 mg at 03/23/21 2107    magnesium hydroxide (MILK OF MAGNESIA) 400 mg/5 mL oral suspension 30 mL, 30 mL, Oral, DAILY PRN, Sylvie Talamantes MD    gabapentin (NEURONTIN) capsule 300 mg, 300 mg, Oral, TID, Sylvie Talamantes MD, 300 mg at 03/23/21 2107    acetaminophen (TYLENOL) tablet 650 mg, 650 mg, Oral, Q6H PRN, Marry Whitney MD, 650 mg at 03/23/21 1352

## 2021-05-10 NOTE — DISCHARGE SUMMARY
PSYCHIATRIC DISCHARGE SUMMARY         IDENTIFICATION:    Patient Name  Rod Abreu   Date of Birth 1980   [facility] [id number]   Medical Record Number  [id number]      Age  45 y.o. PCP None   Admit date:  3/18/2021    Discharge date: 3/24/2021   Room Number  243/02  @ [facility]   Date of Service  3/24/2021            TYPE OF DISCHARGE: REGULAR               CONDITION AT DISCHARGE: stable       PROVISIONAL & DISCHARGE DIAGNOSES:    Bipolar disorder, moderate recurrent with psychotic features  Cannabis abuse       CC & HISTORY OF PRESENT ILLNESS:     Jayne is a 76-year-old female who was admitted to the behavioral health floor after patient presented to the ED with actively responding to auditory hallucinations responding to the voices. Patient presented hearing voices telling her to kill herself. She states \"no one wants me\". Patient also referred to her fiancé and 3year-old daughter not wanting her around. She also expressed having plans to overdose on gabapentin. Patient stated that she has been hearing voices and having suicidal ideation for the past few weeks and had tried to stick to it but was difficult for her to handle and the fiancé called the police to bring her into the hospital.  Patient also presented with increasing depression and anxiety 9 out of 10. Patient denies having any visual hallucinations not feeling paranoid.      SOCIAL HISTORY:    Social History     Socioeconomic History    Marital status: SINGLE     Spouse name: Not on file    Number of children: Not on file    Years of education: Not on file    Highest education level: Not on file   Occupational History    Not on file   Social Needs    Financial resource strain: Not on file    Food insecurity     Worry: Not on file     Inability: Not on file    Transportation needs     Medical: Not on file     Non-medical: Not on file   Tobacco Use    Smoking status: Current Every Day Smoker     Packs/day: 0.25    Smokeless tobacco: Never Used   Substance and Sexual Activity    Alcohol use: No    Drug use: Yes     Frequency: 3.0 times per week     Types: Marijuana    Sexual activity: Yes     Partners: Male   Lifestyle    Physical activity     Days per week: Not on file     Minutes per session: Not on file    Stress: Not on file   Relationships    Social connections     Talks on phone: Not on file     Gets together: Not on file     Attends Denominational service: Not on file     Active member of club or organization: Not on file     Attends meetings of clubs or organizations: Not on file     Relationship status: Not on file    Intimate partner violence     Fear of current or ex partner: Not on file     Emotionally abused: Not on file     Physically abused: Not on file     Forced sexual activity: Not on file   Other Topics Concern     Service Not Asked    Blood Transfusions Not Asked    Caffeine Concern Not Asked    Occupational Exposure Not Asked   [place] Hazards Not Asked    Sleep Concern Not Asked    Stress Concern Not Asked    Weight Concern Not Asked    Special Diet Not Asked    Back Care Not Asked    Exercise Not Asked    Bike Helmet Not Asked   [address] Not Asked    Self-Exams Not Asked   Social History Narrative    Not on file               HOSPITALIZATION COURSE:    Scooter King was admitted to the inpatient psychiatric unit [facility] for acute psychiatric stabilization in regards to symptomatology as described in the HPI above. While on the unit Scooter King was involved in individual, group, occupational and milieu therapy. Psychiatric medications were adjusted during this hospitalization. Scooter King demonstrated a slow, but progressive improvement in overall condition. Much of patient's depression appeared to be related to situational stressors, effects of drugs of abuse, and psychological factors.   Please see individual progress notes for more specific details regarding patient's hospitalization course. At time of discharge, Josh Saucedo is without significant problems of depression, psychosis, ana maria. Patient free of suicidal and homicidal ideations and reports many positive predictive factors in terms of not attempting suicide or homicide. Patient with request for discharge today. There are no grounds to seek a TDO. Patient has maximized benefit to be derived from acute inpatient psychiatric treatment. All members of the treatment team concur with each other in regards to plans for discharge today per patient's request.  Patient and family are aware and in agreement with discharge and discharge plan. LABS AND IMAGAING:    Labs Reviewed   CBC WITH AUTOMATED DIFF - Abnormal; Notable for the following components:       Result Value    HGB 10.0 (*)     HCT 34.5 (*)     MCV 69.3 (*)     MCH 20.1 (*)     MCHC 29.0 (*)     RDW 18.3 (*)     ABS. EOSINOPHILS 0.6 (*)     All other components within normal limits   METABOLIC PANEL, COMPREHENSIVE - Abnormal; Notable for the following components:    Anion gap 4 (*)     Alk.  phosphatase 126 (*)     Globulin 4.1 (*)     A-G Ratio 0.9 (*)     All other components within normal limits   DRUG SCREEN, URINE - Abnormal; Notable for the following components:    THC (TH-CANNABINOL) Positive (*)     All other components within normal limits   URINALYSIS W/ REFLEX CULTURE - Abnormal; Notable for the following components:    Appearance Turbid (*)     Blood Small (*)     Urobilinogen 2.0 (*)     All other components within normal limits   SARS-COV-2   SARS-COV-2     No results found for: DS35, PHEN, PHENO, PHENT, DILF, DS39, PHENY, PTN, VALF2, VALAC, VALP, VALPR, DS6, CRBAM, CRBAMP, CARB2, XCRBAM  Admission on 03/18/2021, Discharged on 03/24/2021   Component Date Value Ref Range Status    WBC 03/18/2021 8.0  3.6 - 11.0 K/uL Final    RBC 03/18/2021 4.98  3.80 - 5.20 M/uL Final    HGB 03/18/2021 10.0* 11.5 - 16.0 g/dL Final    HCT 03/18/2021 34.5* 35.0 - 47.0 % Final    MCV 03/18/2021 69.3* 80.0 - 99.0 FL Final    MCH 03/18/2021 20.1* 26.0 - 34.0 PG Final    MCHC 03/18/2021 29.0* 30.0 - 36.5 g/dL Final    RDW 03/18/2021 18.3* 11.5 - 14.5 % Final    PLATELET 95/69/0219 847  150 - 400 K/uL Final    NRBC 03/18/2021 0.0  0  WBC Final    ABSOLUTE NRBC 03/18/2021 0.00  0.00 - 0.01 K/uL Final    NEUTROPHILS 03/18/2021 64  32 - 75 % Final    LYMPHOCYTES 03/18/2021 22  12 - 49 % Final    MONOCYTES 03/18/2021 6  5 - 13 % Final    EOSINOPHILS 03/18/2021 7  0 - 7 % Final    BASOPHILS 03/18/2021 1  0 - 1 % Final    IMMATURE GRANULOCYTES 03/18/2021 0  0.0 - 0.5 % Final    ABS. NEUTROPHILS 03/18/2021 5.1  1.8 - 8.0 K/UL Final    ABS. LYMPHOCYTES 03/18/2021 1.8  0.8 - 3.5 K/UL Final    ABS. MONOCYTES 03/18/2021 0.5  0.0 - 1.0 K/UL Final    ABS. EOSINOPHILS 03/18/2021 0.6* 0.0 - 0.4 K/UL Final    ABS. BASOPHILS 03/18/2021 0.0  0.0 - 0.1 K/UL Final    ABS. IMM. GRANS. 03/18/2021 0.0  0.00 - 0.04 K/UL Final    DF 03/18/2021 AUTOMATED    Final    Sodium 03/18/2021 138  136 - 145 mmol/L Final    Potassium 03/18/2021 3.6  3.5 - 5.1 mmol/L Final    Chloride 03/18/2021 108  97 - 108 mmol/L Final    CO2 03/18/2021 26  21 - 32 mmol/L Final    Anion gap 03/18/2021 4* 5 - 15 mmol/L Final    Glucose 03/18/2021 86  65 - 100 mg/dL Final    BUN 03/18/2021 11  6 - 20 mg/dL Final    Creatinine 03/18/2021 0.74  0.55 - 1.02 mg/dL Final    BUN/Creatinine ratio 03/18/2021 15  12 - 20   Final    GFR est AA 03/18/2021 >60  >60 ml/min/1.73m2 Final    GFR est non-AA 03/18/2021 >60  >60 ml/min/1.73m2 Final    Calcium 03/18/2021 9.1  8.5 - 10.1 mg/dL Final    Bilirubin, total 03/18/2021 0.2  0.2 - 1.0 mg/dL Final    AST (SGOT) 03/18/2021 20  15 - 37 U/L Final    ALT (SGPT) 03/18/2021 26  12 - 78 U/L Final    Alk.  phosphatase 03/18/2021 126* 45 - 117 U/L Final    Protein, total 03/18/2021 7.7  6.4 - 8.2 g/dL Final    Albumin 03/18/2021 3.6  3.5 - 5.0 g/dL Final    Globulin 03/18/2021 4.1* 2.0 - 4.0 g/dL Final    A-G Ratio 03/18/2021 0.9* 1.1 - 2.2   Final    AMPHETAMINES 03/18/2021 Negative  Negative   Final    BARBITURATES 03/18/2021 Negative  Negative   Final    BENZODIAZEPINES 03/18/2021 Negative  Negative   Final    COCAINE 03/18/2021 Negative  Negative   Final    METHADONE 03/18/2021 Negative  Negative   Final    OPIATES 03/18/2021 Negative  Negative   Final    PCP(PHENCYCLIDINE) 03/18/2021 Negative  Negative   Final    THC (TH-CANNABINOL) 03/18/2021 Positive* Negative   Final    Drug screen comment 03/18/2021 This test is a screen for drugs of abuse in a medical setting only (i.e., they are unconfirmed results and as such must not be used for non-medical purposes, e.g.,employment testing, legal testing). Due to its inherent nature, false positive (FP) and false negative (FN) results may be obtained. Therefore, if necessary for medical care, recommend confirmation of positive findings by GC/MS.     Final    Color 03/18/2021 Yellow/Straw    Final    Appearance 03/18/2021 Turbid* Clear   Final    Specific gravity 03/18/2021 1.021  1.003 - 1.030   Final    pH (UA) 03/18/2021 6.0  5.0 - 8.0   Final    Protein 03/18/2021 Negative  Negative mg/dL Final    Glucose 03/18/2021 Negative  Negative mg/dL Final    Ketone 03/18/2021 Negative  Negative mg/dL Final    Bilirubin 03/18/2021 Negative  Negative   Final    Blood 03/18/2021 Small* Negative   Final    Urobilinogen 03/18/2021 2.0* 0.1 - 1.0 EU/dL Final    Nitrites 03/18/2021 Negative  Negative   Final    Leukocyte Esterase 03/18/2021 Negative  Negative   Final    UA:UC IF INDICATED 03/18/2021 Culture not indicated by UA result  Culture not indicated by UA result   Final    WBC 03/18/2021 0-4  0 - 4 /hpf Final    RBC 03/18/2021 0-5  0 - 5 /hpf Final    Bacteria 03/18/2021 Negative  Negative /hpf Final    Mucus 03/18/2021 Trace  /lpf Final    SARS-CoV-2 03/18/2021 Please find results under separate order    Final    SARS-CoV-2 03/18/2021 Not Detected  Not Detected   Final     No results found. DISPOSITION:    Home. Patient to f/u with drug/etoh rehabilitation, psychiatric and psychotherapy appointments. FOLLOW-UP CARE:    Activity as tolerated  Regular Diet  Wound Care: none needed. Follow-up Information     Follow up With Specialties Details Why Contact Info    None    None ([phone]) Patient stated that they have no PCP      [id number] Mayra De La Cruz  Today For continued care Please Call Mayra De La Cruz at [phone] ext [phone] ASAP to schedule follow up appointment and follow up  psychiatric care with Dr. Mcbride Fess:    limited ---- based on nature of patient's pathology/ies and treatment compliance issues. Prognosis is greatly dependent upon patient's ability to remain sober and to follow up with drug/etoh rehabilitation and psychiatric/psychotherapy appointments as well as to comply with psychiatric medications as prescribed. DISCHARGE MEDICATIONS:    Informed consent given for the use of following psychotropic medications:  Discharge Medication List as of 3/24/2021 12:46 PM      START taking these medications    Details   gabapentin (NEURONTIN) 300 mg capsule Take 1 Cap by mouth three (3) times daily. Max Daily Amount: 900 mg., Normal, Disp-90 Cap, R-0      risperiDONE (RisperDAL) 2 mg tablet Take 1 Tab by mouth two (2) times a day., Normal, Disp-60 Tab, R-0      traZODone (DESYREL) 50 mg tablet Take 1 Tab by mouth nightly as needed for Sleep., Normal, Disp-30 Tab, R-0         CONTINUE these medications which have NOT CHANGED    Details   polyethylene glycol (MIRALAX) 17 gram/dose powder Take 17 g by mouth daily. 1 tablespoon with 8 oz of water daily, Print, Disp-238 g, R-0      prenatal vit-iron fumarate-fa (PRENATAL VITAMIN) 27 mg iron- 0.8 mg tab tablet Take 1 Tab by mouth daily. , Print, Disp-30 Tab, R-0         STOP taking these medications acetaminophen (TYLENOL EXTRA STRENGTH) 500 mg tablet Comments:   Reason for Stopping:                      Signed:  Adeola Paul MD   .